# Patient Record
Sex: MALE | Race: WHITE | Employment: OTHER | URBAN - NONMETROPOLITAN AREA
[De-identification: names, ages, dates, MRNs, and addresses within clinical notes are randomized per-mention and may not be internally consistent; named-entity substitution may affect disease eponyms.]

---

## 2023-03-06 ENCOUNTER — APPOINTMENT (OUTPATIENT)
Age: 73
End: 2023-03-06
Payer: COMMERCIAL

## 2023-03-06 ENCOUNTER — HOSPITAL ENCOUNTER (EMERGENCY)
Age: 73
Discharge: ANOTHER HOSPITAL | End: 2023-03-06
Attending: EMERGENCY MEDICINE
Payer: COMMERCIAL

## 2023-03-06 VITALS
OXYGEN SATURATION: 97 % | HEIGHT: 71 IN | RESPIRATION RATE: 18 BRPM | BODY MASS INDEX: 18.48 KG/M2 | HEART RATE: 70 BPM | WEIGHT: 132 LBS | DIASTOLIC BLOOD PRESSURE: 79 MMHG | SYSTOLIC BLOOD PRESSURE: 147 MMHG | TEMPERATURE: 98 F

## 2023-03-06 DIAGNOSIS — R91.8 LUNG MASS: ICD-10-CM

## 2023-03-06 DIAGNOSIS — S72.001A CLOSED RIGHT HIP FRACTURE, INITIAL ENCOUNTER (HCC): Primary | ICD-10-CM

## 2023-03-06 LAB
ABO + RH BLD: NORMAL
ALBUMIN SERPL-MCNC: 2.6 G/DL (ref 3.4–5)
ALBUMIN/GLOB SERPL: 0.6 (ref 0.8–1.7)
ALP SERPL-CCNC: 91 U/L (ref 45–117)
ALT SERPL-CCNC: 11 U/L (ref 16–61)
ANION GAP SERPL CALC-SCNC: 9 MMOL/L (ref 3–18)
APPEARANCE UR: CLEAR
APTT PPP: 33 SEC (ref 23–36.4)
AST SERPL W P-5'-P-CCNC: 7 U/L (ref 10–38)
BASOPHILS # BLD: 0.1 K/UL (ref 0–0.1)
BASOPHILS NFR BLD: 1 % (ref 0–2)
BILIRUB SERPL-MCNC: 0.3 MG/DL (ref 0.2–1)
BILIRUB UR QL: NEGATIVE
BLOOD GROUP ANTIBODIES SERPL: NEGATIVE
BUN SERPL-MCNC: 20 MG/DL (ref 7–18)
BUN/CREAT SERPL: 18 (ref 12–20)
CA-I BLD-MCNC: 9 MG/DL (ref 8.5–10.1)
CHLORIDE SERPL-SCNC: 105 MMOL/L (ref 100–111)
CO2 SERPL-SCNC: 27 MMOL/L (ref 21–32)
COLOR UR: YELLOW
CREAT SERPL-MCNC: 1.12 MG/DL (ref 0.6–1.3)
DIFFERENTIAL METHOD BLD: ABNORMAL
EOSINOPHIL # BLD: 0.3 K/UL (ref 0–0.4)
EOSINOPHIL NFR BLD: 2 % (ref 0–5)
ERYTHROCYTE [DISTWIDTH] IN BLOOD BY AUTOMATED COUNT: 17.2 % (ref 11.6–14.5)
GLOBULIN SER CALC-MCNC: 4.4 G/DL (ref 2–4)
GLUCOSE SERPL-MCNC: 93 MG/DL (ref 74–99)
GLUCOSE UR STRIP.AUTO-MCNC: NEGATIVE MG/DL
HCT VFR BLD AUTO: 36.2 % (ref 36–48)
HGB BLD-MCNC: 11 G/DL (ref 13–16)
HGB UR QL STRIP: NEGATIVE
IMM GRANULOCYTES # BLD AUTO: 0.1 K/UL (ref 0–0.04)
IMM GRANULOCYTES NFR BLD AUTO: 1 % (ref 0–0.5)
INR PPP: 1 (ref 0.8–1.2)
KETONES UR QL STRIP.AUTO: NEGATIVE MG/DL
LEUKOCYTE ESTERASE UR QL STRIP.AUTO: NEGATIVE
LYMPHOCYTES # BLD: 2.5 K/UL (ref 0.9–3.6)
LYMPHOCYTES NFR BLD: 19 % (ref 21–52)
MCH RBC QN AUTO: 27.1 PG (ref 24–34)
MCHC RBC AUTO-ENTMCNC: 30.4 G/DL (ref 31–37)
MCV RBC AUTO: 89.2 FL (ref 78–100)
MONOCYTES # BLD: 0.9 K/UL (ref 0.05–1.2)
MONOCYTES NFR BLD: 7 % (ref 3–10)
NEUTS SEG # BLD: 9.6 K/UL (ref 1.8–8)
NEUTS SEG NFR BLD: 70 % (ref 40–73)
NITRITE UR QL STRIP.AUTO: NEGATIVE
NRBC # BLD: 0 K/UL (ref 0–0.01)
NRBC BLD-RTO: 0 PER 100 WBC
PH UR STRIP: 6.5 (ref 5–8)
PLATELET # BLD AUTO: 350 K/UL (ref 135–420)
PMV BLD AUTO: 10.2 FL (ref 9.2–11.8)
POTASSIUM SERPL-SCNC: 3.6 MMOL/L (ref 3.5–5.5)
PROT SERPL-MCNC: 7 G/DL (ref 6.4–8.2)
PROT UR STRIP-MCNC: 30 MG/DL
PROTHROMBIN TIME: 13.8 SEC (ref 11.5–15.2)
RBC # BLD AUTO: 4.06 M/UL (ref 4.35–5.65)
SODIUM SERPL-SCNC: 141 MMOL/L (ref 136–145)
SP GR UR REFRACTOMETRY: 1.01 (ref 1–1.03)
SPECIMEN EXP DATE BLD: NORMAL
THERAPEUTIC RANGE: NORMAL SEC (ref 82–109)
UROBILINOGEN UR QL STRIP.AUTO: NEGATIVE EU/DL (ref 0.2–1)
WBC # BLD AUTO: 13.6 K/UL (ref 4.6–13.2)

## 2023-03-06 PROCEDURE — 81003 URINALYSIS AUTO W/O SCOPE: CPT

## 2023-03-06 PROCEDURE — 71045 X-RAY EXAM CHEST 1 VIEW: CPT

## 2023-03-06 PROCEDURE — 96376 TX/PRO/DX INJ SAME DRUG ADON: CPT

## 2023-03-06 PROCEDURE — 6360000002 HC RX W HCPCS: Performed by: EMERGENCY MEDICINE

## 2023-03-06 PROCEDURE — 85610 PROTHROMBIN TIME: CPT

## 2023-03-06 PROCEDURE — 85730 THROMBOPLASTIN TIME PARTIAL: CPT

## 2023-03-06 PROCEDURE — 36415 COLL VENOUS BLD VENIPUNCTURE: CPT

## 2023-03-06 PROCEDURE — 86900 BLOOD TYPING SEROLOGIC ABO: CPT

## 2023-03-06 PROCEDURE — 73501 X-RAY EXAM HIP UNI 1 VIEW: CPT

## 2023-03-06 PROCEDURE — 73552 X-RAY EXAM OF FEMUR 2/>: CPT

## 2023-03-06 PROCEDURE — 96374 THER/PROPH/DIAG INJ IV PUSH: CPT

## 2023-03-06 PROCEDURE — 85025 COMPLETE CBC W/AUTO DIFF WBC: CPT

## 2023-03-06 PROCEDURE — 80053 COMPREHEN METABOLIC PANEL: CPT

## 2023-03-06 PROCEDURE — 99285 EMERGENCY DEPT VISIT HI MDM: CPT

## 2023-03-06 RX ORDER — MORPHINE SULFATE 4 MG/ML
4 INJECTION, SOLUTION INTRAMUSCULAR; INTRAVENOUS
Status: COMPLETED | OUTPATIENT
Start: 2023-03-06 | End: 2023-03-06

## 2023-03-06 RX ORDER — CARVEDILOL 25 MG/1
25 TABLET ORAL 2 TIMES DAILY WITH MEALS
COMMUNITY

## 2023-03-06 RX ADMIN — MORPHINE SULFATE 4 MG: 4 INJECTION, SOLUTION INTRAMUSCULAR; INTRAVENOUS at 22:57

## 2023-03-06 RX ADMIN — MORPHINE SULFATE 4 MG: 4 INJECTION, SOLUTION INTRAMUSCULAR; INTRAVENOUS at 19:48

## 2023-03-06 ASSESSMENT — LIFESTYLE VARIABLES
HOW MANY STANDARD DRINKS CONTAINING ALCOHOL DO YOU HAVE ON A TYPICAL DAY: PATIENT DOES NOT DRINK
HOW OFTEN DO YOU HAVE A DRINK CONTAINING ALCOHOL: NEVER

## 2023-03-06 ASSESSMENT — PAIN SCALES - GENERAL
PAINLEVEL_OUTOF10: 0
PAINLEVEL_OUTOF10: 2
PAINLEVEL_OUTOF10: 5
PAINLEVEL_OUTOF10: 4
PAINLEVEL_OUTOF10: 4

## 2023-03-06 ASSESSMENT — PAIN DESCRIPTION - ORIENTATION
ORIENTATION: RIGHT

## 2023-03-06 ASSESSMENT — PAIN DESCRIPTION - LOCATION
LOCATION: HIP

## 2023-03-06 ASSESSMENT — PAIN - FUNCTIONAL ASSESSMENT
PAIN_FUNCTIONAL_ASSESSMENT: 0-10
PAIN_FUNCTIONAL_ASSESSMENT: NONE - DENIES PAIN

## 2023-03-06 ASSESSMENT — PAIN DESCRIPTION - DESCRIPTORS: DESCRIPTORS: DULL

## 2023-03-06 NOTE — ED TRIAGE NOTES
Pt reports he fell about a month ago landing on his right hip, pt reports hip pain since then and has been unable to ambulate.

## 2023-03-07 ENCOUNTER — APPOINTMENT (OUTPATIENT)
Dept: CT IMAGING | Age: 73
DRG: 481 | End: 2023-03-07
Attending: STUDENT IN AN ORGANIZED HEALTH CARE EDUCATION/TRAINING PROGRAM
Payer: COMMERCIAL

## 2023-03-07 ENCOUNTER — APPOINTMENT (OUTPATIENT)
Dept: GENERAL RADIOLOGY | Age: 73
DRG: 481 | End: 2023-03-07
Attending: STUDENT IN AN ORGANIZED HEALTH CARE EDUCATION/TRAINING PROGRAM
Payer: COMMERCIAL

## 2023-03-07 ENCOUNTER — APPOINTMENT (OUTPATIENT)
Dept: GENERAL RADIOLOGY | Age: 73
DRG: 481 | End: 2023-03-07
Attending: PHYSICIAN ASSISTANT
Payer: COMMERCIAL

## 2023-03-07 ENCOUNTER — ANESTHESIA (OUTPATIENT)
Dept: SURGERY | Age: 73
End: 2023-03-07
Payer: COMMERCIAL

## 2023-03-07 ENCOUNTER — ANESTHESIA EVENT (OUTPATIENT)
Dept: SURGERY | Age: 73
End: 2023-03-07
Payer: COMMERCIAL

## 2023-03-07 ENCOUNTER — APPOINTMENT (OUTPATIENT)
Dept: GENERAL RADIOLOGY | Age: 73
DRG: 481 | End: 2023-03-07
Attending: ORTHOPAEDIC SURGERY
Payer: COMMERCIAL

## 2023-03-07 ENCOUNTER — HOSPITAL ENCOUNTER (INPATIENT)
Age: 73
LOS: 6 days | Discharge: NURSING, REHAB OR LONG-TERM CARE | DRG: 481 | End: 2023-03-13
Attending: STUDENT IN AN ORGANIZED HEALTH CARE EDUCATION/TRAINING PROGRAM | Admitting: INTERNAL MEDICINE
Payer: COMMERCIAL

## 2023-03-07 DIAGNOSIS — S72.144B: ICD-10-CM

## 2023-03-07 DIAGNOSIS — R09.89 SUSPECTED DEEP VEIN THROMBOSIS (DVT): ICD-10-CM

## 2023-03-07 DIAGNOSIS — S72.144A CLOSED NONDISPLACED INTERTROCHANTERIC FRACTURE OF RIGHT FEMUR, INITIAL ENCOUNTER (HCC): Primary | ICD-10-CM

## 2023-03-07 PROBLEM — S72.143A INTERTROCHANTERIC FRACTURE (HCC): Status: ACTIVE | Noted: 2023-03-07

## 2023-03-07 LAB
ABO + RH BLD: NORMAL
ALBUMIN SERPL-MCNC: 2.3 G/DL (ref 3.5–5)
ALBUMIN/GLOB SERPL: 0.6 (ref 1.1–2.2)
ALP SERPL-CCNC: 75 U/L (ref 45–117)
ALT SERPL-CCNC: 9 U/L (ref 12–78)
ANION GAP SERPL CALC-SCNC: 3 MMOL/L (ref 5–15)
AST SERPL W P-5'-P-CCNC: 9 U/L (ref 15–37)
BASOPHILS # BLD: 0.1 K/UL (ref 0–0.1)
BASOPHILS NFR BLD: 1 % (ref 0–1)
BILIRUB SERPL-MCNC: 0.3 MG/DL (ref 0.2–1)
BLOOD GROUP ANTIBODIES SERPL: NEGATIVE
BUN SERPL-MCNC: 18 MG/DL (ref 6–20)
BUN/CREAT SERPL: 16 (ref 12–20)
CA-I BLD-MCNC: 8.7 MG/DL (ref 8.5–10.1)
CHLORIDE SERPL-SCNC: 109 MMOL/L (ref 97–108)
CO2 SERPL-SCNC: 26 MMOL/L (ref 21–32)
CREAT SERPL-MCNC: 1.16 MG/DL (ref 0.7–1.3)
DIFFERENTIAL METHOD BLD: ABNORMAL
EKG ATRIAL RATE: 68 BPM
EKG DIAGNOSIS: NORMAL
EKG P AXIS: 65 DEGREES
EKG P-R INTERVAL: 157 MS
EKG Q-T INTERVAL: 433 MS
EKG QRS DURATION: 157 MS
EKG QTC CALCULATION (BAZETT): 457 MS
EKG R AXIS: -48 DEGREES
EKG T AXIS: 98 DEGREES
EKG VENTRICULAR RATE: 67 BPM
EOSINOPHIL # BLD: 0.3 K/UL (ref 0–0.4)
EOSINOPHIL NFR BLD: 2 % (ref 0–7)
ERYTHROCYTE [DISTWIDTH] IN BLOOD BY AUTOMATED COUNT: 17.1 % (ref 11.5–14.5)
GLOBULIN SER CALC-MCNC: 3.9 G/DL (ref 2–4)
GLUCOSE SERPL-MCNC: 103 MG/DL (ref 65–100)
HCT VFR BLD AUTO: 32.1 % (ref 36.6–50.3)
HGB BLD-MCNC: 10 G/DL (ref 12.1–17)
IMM GRANULOCYTES # BLD AUTO: 0 K/UL (ref 0–0.04)
IMM GRANULOCYTES NFR BLD AUTO: 0 % (ref 0–0.5)
INR PPP: 1.1 (ref 0.9–1.1)
LYMPHOCYTES # BLD: 2.7 K/UL (ref 0.8–3.5)
LYMPHOCYTES NFR BLD: 24 % (ref 12–49)
MCH RBC QN AUTO: 27.2 PG (ref 26–34)
MCHC RBC AUTO-ENTMCNC: 31.2 G/DL (ref 30–36.5)
MCV RBC AUTO: 87.5 FL (ref 80–99)
MONOCYTES # BLD: 0.9 K/UL (ref 0–1)
MONOCYTES NFR BLD: 8 % (ref 5–13)
NEUTS SEG # BLD: 7.2 K/UL (ref 1.8–8)
NEUTS SEG NFR BLD: 65 % (ref 32–75)
NRBC # BLD: 0 K/UL (ref 0–0.01)
NRBC BLD-RTO: 0 PER 100 WBC
PLATELET # BLD AUTO: 283 K/UL (ref 150–400)
PMV BLD AUTO: 9.9 FL (ref 8.9–12.9)
POTASSIUM SERPL-SCNC: 3.5 MMOL/L (ref 3.5–5.1)
PROT SERPL-MCNC: 6.2 G/DL (ref 6.4–8.2)
PROTHROMBIN TIME: 13.9 SEC (ref 11.9–14.6)
RBC # BLD AUTO: 3.67 M/UL (ref 4.1–5.7)
SODIUM SERPL-SCNC: 138 MMOL/L (ref 136–145)
SPECIMEN EXP DATE BLD: NORMAL
WBC # BLD AUTO: 11.1 K/UL (ref 4.1–11.1)

## 2023-03-07 PROCEDURE — 76060000034 HC ANESTHESIA 1.5 TO 2 HR: Performed by: ORTHOPAEDIC SURGERY

## 2023-03-07 PROCEDURE — 77030031139 HC SUT VCRL2 J&J -A: Performed by: ORTHOPAEDIC SURGERY

## 2023-03-07 PROCEDURE — 0QS6XZZ REPOSITION RIGHT UPPER FEMUR, EXTERNAL APPROACH: ICD-10-PCS | Performed by: ORTHOPAEDIC SURGERY

## 2023-03-07 PROCEDURE — 99285 EMERGENCY DEPT VISIT HI MDM: CPT

## 2023-03-07 PROCEDURE — 86900 BLOOD TYPING SEROLOGIC ABO: CPT

## 2023-03-07 PROCEDURE — 0QH606Z INSERTION OF INTRAMEDULLARY INTERNAL FIXATION DEVICE INTO RIGHT UPPER FEMUR, OPEN APPROACH: ICD-10-PCS | Performed by: ORTHOPAEDIC SURGERY

## 2023-03-07 PROCEDURE — 74011250637 HC RX REV CODE- 250/637: Performed by: PHYSICIAN ASSISTANT

## 2023-03-07 PROCEDURE — 74011250636 HC RX REV CODE- 250/636: Performed by: NURSE ANESTHETIST, CERTIFIED REGISTERED

## 2023-03-07 PROCEDURE — 77030027467 HC RMR IM BIXCT DISP STRY -F: Performed by: ORTHOPAEDIC SURGERY

## 2023-03-07 PROCEDURE — C1713 ANCHOR/SCREW BN/BN,TIS/BN: HCPCS | Performed by: ORTHOPAEDIC SURGERY

## 2023-03-07 PROCEDURE — 77030016452 HC BIT DRL AO4 STRY -C: Performed by: ORTHOPAEDIC SURGERY

## 2023-03-07 PROCEDURE — 73700 CT LOWER EXTREMITY W/O DYE: CPT

## 2023-03-07 PROCEDURE — C1769 GUIDE WIRE: HCPCS | Performed by: ORTHOPAEDIC SURGERY

## 2023-03-07 PROCEDURE — 74011000250 HC RX REV CODE- 250: Performed by: PHYSICIAN ASSISTANT

## 2023-03-07 PROCEDURE — 65270000029 HC RM PRIVATE

## 2023-03-07 PROCEDURE — 74011000250 HC RX REV CODE- 250: Performed by: NURSE ANESTHETIST, CERTIFIED REGISTERED

## 2023-03-07 PROCEDURE — 74011250636 HC RX REV CODE- 250/636: Performed by: STUDENT IN AN ORGANIZED HEALTH CARE EDUCATION/TRAINING PROGRAM

## 2023-03-07 PROCEDURE — 80053 COMPREHEN METABOLIC PANEL: CPT

## 2023-03-07 PROCEDURE — 2709999900 HC NON-CHARGEABLE SUPPLY: Performed by: ORTHOPAEDIC SURGERY

## 2023-03-07 PROCEDURE — 74011250636 HC RX REV CODE- 250/636: Performed by: PHYSICIAN ASSISTANT

## 2023-03-07 PROCEDURE — 74011000250 HC RX REV CODE- 250: Performed by: ANESTHESIOLOGY

## 2023-03-07 PROCEDURE — 76010000153 HC OR TIME 1.5 TO 2 HR: Performed by: ORTHOPAEDIC SURGERY

## 2023-03-07 PROCEDURE — 85610 PROTHROMBIN TIME: CPT

## 2023-03-07 PROCEDURE — 76210000006 HC OR PH I REC 0.5 TO 1 HR: Performed by: ORTHOPAEDIC SURGERY

## 2023-03-07 PROCEDURE — 36415 COLL VENOUS BLD VENIPUNCTURE: CPT

## 2023-03-07 PROCEDURE — 0QS636Z REPOSITION RIGHT UPPER FEMUR WITH INTRAMEDULLARY INTERNAL FIXATION DEVICE, PERCUTANEOUS APPROACH: ICD-10-PCS | Performed by: ORTHOPAEDIC SURGERY

## 2023-03-07 PROCEDURE — 76000 FLUOROSCOPY <1 HR PHYS/QHP: CPT

## 2023-03-07 PROCEDURE — 77030002916 HC SUT ETHLN J&J -A: Performed by: ORTHOPAEDIC SURGERY

## 2023-03-07 PROCEDURE — 85025 COMPLETE CBC W/AUTO DIFF WBC: CPT

## 2023-03-07 PROCEDURE — 77030040361 HC SLV COMPR DVT MDII -B: Performed by: ORTHOPAEDIC SURGERY

## 2023-03-07 PROCEDURE — 73552 X-RAY EXAM OF FEMUR 2/>: CPT

## 2023-03-07 PROCEDURE — 74011250637 HC RX REV CODE- 250/637: Performed by: INTERNAL MEDICINE

## 2023-03-07 DEVICE — LONG NAIL KIT R1.5, TI, RIGHT
Type: IMPLANTABLE DEVICE | Site: HIP | Status: FUNCTIONAL
Brand: GAMMA

## 2023-03-07 DEVICE — LAG SCREW, TI
Type: IMPLANTABLE DEVICE | Site: HIP | Status: FUNCTIONAL
Brand: GAMMA

## 2023-03-07 DEVICE — LOCKING SCREW, FULLY THREADED: Type: IMPLANTABLE DEVICE | Site: HIP | Status: FUNCTIONAL

## 2023-03-07 RX ORDER — OXYCODONE AND ACETAMINOPHEN 5; 325 MG/1; MG/1
1 TABLET ORAL
Status: CANCELLED | OUTPATIENT
Start: 2023-03-07 | End: 2023-03-08

## 2023-03-07 RX ORDER — MORPHINE SULFATE 4 MG/ML
4 INJECTION INTRAVENOUS
Status: DISCONTINUED | OUTPATIENT
Start: 2023-03-07 | End: 2023-03-07

## 2023-03-07 RX ORDER — MORPHINE SULFATE 15 MG/1
15 TABLET ORAL
Status: CANCELLED | OUTPATIENT
Start: 2023-03-07 | End: 2023-03-08

## 2023-03-07 RX ORDER — ACETAMINOPHEN 500 MG
1000 TABLET ORAL EVERY 6 HOURS
Status: DISCONTINUED | OUTPATIENT
Start: 2023-03-07 | End: 2023-03-13 | Stop reason: HOSPADM

## 2023-03-07 RX ORDER — TRAMADOL HYDROCHLORIDE 50 MG/1
50 TABLET ORAL
Status: DISCONTINUED | OUTPATIENT
Start: 2023-03-07 | End: 2023-03-09

## 2023-03-07 RX ORDER — PROPOFOL 10 MG/ML
INJECTION, EMULSION INTRAVENOUS AS NEEDED
Status: DISCONTINUED | OUTPATIENT
Start: 2023-03-07 | End: 2023-03-07 | Stop reason: HOSPADM

## 2023-03-07 RX ORDER — CEFAZOLIN SODIUM 1 G/3ML
INJECTION, POWDER, FOR SOLUTION INTRAMUSCULAR; INTRAVENOUS AS NEEDED
Status: DISCONTINUED | OUTPATIENT
Start: 2023-03-07 | End: 2023-03-07 | Stop reason: HOSPADM

## 2023-03-07 RX ORDER — HYDROMORPHONE HYDROCHLORIDE 1 MG/ML
0.5 INJECTION, SOLUTION INTRAMUSCULAR; INTRAVENOUS; SUBCUTANEOUS
Status: CANCELLED | OUTPATIENT
Start: 2023-03-07

## 2023-03-07 RX ORDER — FENTANYL CITRATE 50 UG/ML
50 INJECTION, SOLUTION INTRAMUSCULAR; INTRAVENOUS AS NEEDED
Status: CANCELLED | OUTPATIENT
Start: 2023-03-07

## 2023-03-07 RX ORDER — MIDAZOLAM HYDROCHLORIDE 1 MG/ML
1 INJECTION, SOLUTION INTRAMUSCULAR; INTRAVENOUS AS NEEDED
Status: CANCELLED | OUTPATIENT
Start: 2023-03-07

## 2023-03-07 RX ORDER — ONDANSETRON 2 MG/ML
INJECTION INTRAMUSCULAR; INTRAVENOUS AS NEEDED
Status: DISCONTINUED | OUTPATIENT
Start: 2023-03-07 | End: 2023-03-07 | Stop reason: HOSPADM

## 2023-03-07 RX ORDER — DIPHENHYDRAMINE HYDROCHLORIDE 50 MG/ML
12.5 INJECTION, SOLUTION INTRAMUSCULAR; INTRAVENOUS AS NEEDED
Status: CANCELLED | OUTPATIENT
Start: 2023-03-07 | End: 2023-03-07

## 2023-03-07 RX ORDER — SODIUM CHLORIDE 9 MG/ML
50 INJECTION, SOLUTION INTRAVENOUS CONTINUOUS
Status: DISCONTINUED | OUTPATIENT
Start: 2023-03-07 | End: 2023-03-09

## 2023-03-07 RX ORDER — SODIUM CHLORIDE, SODIUM LACTATE, POTASSIUM CHLORIDE, CALCIUM CHLORIDE 600; 310; 30; 20 MG/100ML; MG/100ML; MG/100ML; MG/100ML
INJECTION, SOLUTION INTRAVENOUS
Status: DISCONTINUED | OUTPATIENT
Start: 2023-03-07 | End: 2023-03-07 | Stop reason: HOSPADM

## 2023-03-07 RX ORDER — CARVEDILOL 12.5 MG/1
25 TABLET ORAL 2 TIMES DAILY WITH MEALS
Status: DISCONTINUED | OUTPATIENT
Start: 2023-03-07 | End: 2023-03-13 | Stop reason: HOSPADM

## 2023-03-07 RX ORDER — CELECOXIB 200 MG/1
200 CAPSULE ORAL 2 TIMES DAILY
Status: DISCONTINUED | OUTPATIENT
Start: 2023-03-07 | End: 2023-03-11

## 2023-03-07 RX ORDER — METOPROLOL TARTRATE 5 MG/5ML
2.5 INJECTION INTRAVENOUS
Status: CANCELLED | OUTPATIENT
Start: 2023-03-07

## 2023-03-07 RX ORDER — LIDOCAINE HYDROCHLORIDE 20 MG/ML
INJECTION, SOLUTION EPIDURAL; INFILTRATION; INTRACAUDAL; PERINEURAL AS NEEDED
Status: DISCONTINUED | OUTPATIENT
Start: 2023-03-07 | End: 2023-03-07 | Stop reason: HOSPADM

## 2023-03-07 RX ORDER — LABETALOL HCL 20 MG/4 ML
5 SYRINGE (ML) INTRAVENOUS
Status: CANCELLED | OUTPATIENT
Start: 2023-03-07

## 2023-03-07 RX ORDER — MORPHINE SULFATE 2 MG/ML
2 INJECTION, SOLUTION INTRAMUSCULAR; INTRAVENOUS
Status: CANCELLED | OUTPATIENT
Start: 2023-03-07

## 2023-03-07 RX ORDER — EPHEDRINE SULFATE/0.9% NACL/PF 50 MG/5 ML
5 SYRINGE (ML) INTRAVENOUS AS NEEDED
Status: CANCELLED | OUTPATIENT
Start: 2023-03-07

## 2023-03-07 RX ORDER — DEXAMETHASONE SODIUM PHOSPHATE 4 MG/ML
INJECTION, SOLUTION INTRA-ARTICULAR; INTRALESIONAL; INTRAMUSCULAR; INTRAVENOUS; SOFT TISSUE AS NEEDED
Status: DISCONTINUED | OUTPATIENT
Start: 2023-03-07 | End: 2023-03-07 | Stop reason: HOSPADM

## 2023-03-07 RX ORDER — ENOXAPARIN SODIUM 100 MG/ML
40 INJECTION SUBCUTANEOUS EVERY 24 HOURS
Status: DISCONTINUED | OUTPATIENT
Start: 2023-03-08 | End: 2023-03-13 | Stop reason: HOSPADM

## 2023-03-07 RX ORDER — CARVEDILOL 25 MG/1
25 TABLET ORAL 2 TIMES DAILY WITH MEALS
COMMUNITY

## 2023-03-07 RX ORDER — BUPIVACAINE HYDROCHLORIDE 7.5 MG/ML
INJECTION, SOLUTION INTRASPINAL
Status: COMPLETED | OUTPATIENT
Start: 2023-03-07 | End: 2023-03-07

## 2023-03-07 RX ORDER — SODIUM CHLORIDE 0.9 % (FLUSH) 0.9 %
5-40 SYRINGE (ML) INJECTION EVERY 8 HOURS
Status: CANCELLED | OUTPATIENT
Start: 2023-03-07

## 2023-03-07 RX ORDER — FENTANYL CITRATE 50 UG/ML
INJECTION, SOLUTION INTRAMUSCULAR; INTRAVENOUS AS NEEDED
Status: DISCONTINUED | OUTPATIENT
Start: 2023-03-07 | End: 2023-03-07 | Stop reason: HOSPADM

## 2023-03-07 RX ORDER — LIDOCAINE HYDROCHLORIDE 10 MG/ML
0.1 INJECTION, SOLUTION EPIDURAL; INFILTRATION; INTRACAUDAL; PERINEURAL AS NEEDED
Status: CANCELLED | OUTPATIENT
Start: 2023-03-07

## 2023-03-07 RX ORDER — OXYCODONE AND ACETAMINOPHEN 5; 325 MG/1; MG/1
1 TABLET ORAL AS NEEDED
Status: CANCELLED | OUTPATIENT
Start: 2023-03-07

## 2023-03-07 RX ORDER — MORPHINE SULFATE 2 MG/ML
1 INJECTION, SOLUTION INTRAMUSCULAR; INTRAVENOUS
Status: DISPENSED | OUTPATIENT
Start: 2023-03-07 | End: 2023-03-09

## 2023-03-07 RX ORDER — FENTANYL CITRATE 50 UG/ML
50 INJECTION, SOLUTION INTRAMUSCULAR; INTRAVENOUS
Status: CANCELLED | OUTPATIENT
Start: 2023-03-07

## 2023-03-07 RX ORDER — HYDROCODONE BITARTRATE AND ACETAMINOPHEN 5; 325 MG/1; MG/1
1 TABLET ORAL
Status: CANCELLED | OUTPATIENT
Start: 2023-03-07 | End: 2023-03-08

## 2023-03-07 RX ORDER — HYDRALAZINE HYDROCHLORIDE 20 MG/ML
10 INJECTION INTRAMUSCULAR; INTRAVENOUS
Status: DISCONTINUED | OUTPATIENT
Start: 2023-03-07 | End: 2023-03-07 | Stop reason: HOSPADM

## 2023-03-07 RX ORDER — SODIUM CHLORIDE 0.9 % (FLUSH) 0.9 %
5-40 SYRINGE (ML) INJECTION AS NEEDED
Status: CANCELLED | OUTPATIENT
Start: 2023-03-07

## 2023-03-07 RX ORDER — ONDANSETRON 2 MG/ML
4 INJECTION INTRAMUSCULAR; INTRAVENOUS AS NEEDED
Status: CANCELLED | OUTPATIENT
Start: 2023-03-07

## 2023-03-07 RX ORDER — MIDAZOLAM HYDROCHLORIDE 1 MG/ML
INJECTION, SOLUTION INTRAMUSCULAR; INTRAVENOUS AS NEEDED
Status: DISCONTINUED | OUTPATIENT
Start: 2023-03-07 | End: 2023-03-07 | Stop reason: HOSPADM

## 2023-03-07 RX ORDER — MIDAZOLAM HYDROCHLORIDE 1 MG/ML
0.5 INJECTION, SOLUTION INTRAMUSCULAR; INTRAVENOUS
Status: CANCELLED | OUTPATIENT
Start: 2023-03-07

## 2023-03-07 RX ORDER — OXYCODONE HYDROCHLORIDE 5 MG/1
5 TABLET ORAL
Status: CANCELLED | OUTPATIENT
Start: 2023-03-07 | End: 2023-03-08

## 2023-03-07 RX ORDER — HYDROMORPHONE HYDROCHLORIDE 2 MG/1
1 TABLET ORAL
Status: CANCELLED | OUTPATIENT
Start: 2023-03-07 | End: 2023-03-08

## 2023-03-07 RX ADMIN — TRAMADOL HYDROCHLORIDE 50 MG: 50 TABLET ORAL at 18:33

## 2023-03-07 RX ADMIN — MIDAZOLAM HYDROCHLORIDE 1 MG: 2 INJECTION, SOLUTION INTRAMUSCULAR; INTRAVENOUS at 15:05

## 2023-03-07 RX ADMIN — BUPIVACAINE HYDROCHLORIDE 13.5 MG: 7.5 INJECTION, SOLUTION INTRASPINAL at 15:07

## 2023-03-07 RX ADMIN — CARVEDILOL 25 MG: 12.5 TABLET, FILM COATED ORAL at 17:20

## 2023-03-07 RX ADMIN — SODIUM CHLORIDE 75 ML/HR: 9 INJECTION, SOLUTION INTRAVENOUS at 10:05

## 2023-03-07 RX ADMIN — ACETAMINOPHEN 1000 MG: 325 TABLET ORAL at 18:33

## 2023-03-07 RX ADMIN — CEFAZOLIN 2 G: 1 INJECTION, POWDER, FOR SOLUTION INTRAMUSCULAR; INTRAVENOUS at 22:29

## 2023-03-07 RX ADMIN — ONDANSETRON 4 MG: 2 INJECTION INTRAMUSCULAR; INTRAVENOUS at 15:17

## 2023-03-07 RX ADMIN — MIDAZOLAM HYDROCHLORIDE 1 MG: 2 INJECTION, SOLUTION INTRAMUSCULAR; INTRAVENOUS at 15:17

## 2023-03-07 RX ADMIN — FENTANYL CITRATE 25 MCG: 0.05 INJECTION, SOLUTION INTRAMUSCULAR; INTRAVENOUS at 15:52

## 2023-03-07 RX ADMIN — PROPOFOL 20 MG: 10 INJECTION, EMULSION INTRAVENOUS at 15:17

## 2023-03-07 RX ADMIN — LIDOCAINE HYDROCHLORIDE 60 MG: 20 INJECTION, SOLUTION EPIDURAL; INFILTRATION; INTRACAUDAL; PERINEURAL at 15:15

## 2023-03-07 RX ADMIN — SODIUM CHLORIDE, POTASSIUM CHLORIDE, SODIUM LACTATE AND CALCIUM CHLORIDE: 600; 310; 30; 20 INJECTION, SOLUTION INTRAVENOUS at 14:57

## 2023-03-07 RX ADMIN — SODIUM CHLORIDE 75 ML/HR: 9 INJECTION, SOLUTION INTRAVENOUS at 04:12

## 2023-03-07 RX ADMIN — CELECOXIB 200 MG: 200 CAPSULE ORAL at 20:28

## 2023-03-07 RX ADMIN — DEXAMETHASONE SODIUM PHOSPHATE 4 MG: 4 INJECTION, SOLUTION INTRA-ARTICULAR; INTRALESIONAL; INTRAMUSCULAR; INTRAVENOUS; SOFT TISSUE at 15:17

## 2023-03-07 RX ADMIN — FENTANYL CITRATE 25 MCG: 0.05 INJECTION, SOLUTION INTRAMUSCULAR; INTRAVENOUS at 15:57

## 2023-03-07 RX ADMIN — MORPHINE SULFATE 1 MG: 2 INJECTION, SOLUTION INTRAMUSCULAR; INTRAVENOUS at 20:28

## 2023-03-07 RX ADMIN — CEFAZOLIN SODIUM 2 G: 1 INJECTION, POWDER, FOR SOLUTION INTRAMUSCULAR; INTRAVENOUS at 15:23

## 2023-03-07 NOTE — OP NOTES
Operative report on Reji Fallon  Date of surgery 7 March 2023   preop diagnosis is intertrochanteric fracture right hip with subtrochanteric extension  Postop diagnosis same  Procedure closed reduction and and placement of a long gamma nail to the right femur  Surgeon Dr Pardeep Mccann anesthesia by Dr. Rojas Chi  Blood loss 50 cc  Complications none  Assistant none  Indication for surgery intertrochanteric fracture right hip with subtrochanteric extension  Clinical note  Patient is 81years old and he presented with an intertrochanteric fracture of the right hip with subtrochanteric extension. Procedure note after induction of general anesthesia and endotracheal intubation patient was positioned supine on the fracture table. We made sure that the hydrocele was supported with towels as not to cause any traction on the scrotum. The scrotum was placed on the opposite side from the fracture. Gentle traction was applied to the right lower limb in neutral rotation. C-arm was used to assess reduction on the AP and lateral view and this was found to be satisfactory. The abdomen right hip and right lower limb were prepped and draped in the usual fashion. We then proceeded with an incision proximal from the tip of the greater trochanter. A Steinmann pin was introduced into the tip of the greater trochanter and its placement was checked on the AP and lateral view using the C arm and found to be satisfactory. We proceeded with reaming and opening along the Steinmann pin. A guidewire was then introduced into the intramedullary canal of the proximal fragment and pushed into the intramedullary canal of the distal fragment. C-arm was used to assess the position of the guidewire on the AP and lateral view and this was found to be satisfactory. Proceeded with reaming along the guidewire all the way to 13 mm. A 10 mm x 380 mm jazmine was then pushed along the guidewire.   The guidewire was then removed and using the appropriate guide and another lateral proximal incision we proceeded with placing a Steinmann pin into the neck and into the head. The position of the Steinmann pin was checked on the AP and lateral view and found to be satisfactory. We proceeded with reaming along the Steinmann pin all the way to 5 mm from the subchondral bone. A lag screw was then placed along with the Steinmann pin and this measured 95 mm. A lag screw was locked with a small screw proximally. Distally the jazmine was locked with 1 locking screw through a small incision and this was done under fluoroscopic guidance. C-arm was used to assess reduction on the AP and lateral view and this was found to be satisfactory. As well the position of the hardware was found to be optimal.  We washed out the wound with dilute Benadene. We closed the subcutaneous layer with interrupted 0 Vicryl sutures. Skin and subcutaneous tissue was closed with vertical mattresses of 2-0 nylon we also used staples. Testing was applied with Betadine ointment Telfa gauze ABD and tape. The patient tolerated the procedure well and was taken back to the recovery room in good condition. Estimated blood loss was 50 cc. The sponge and needle count was correct at the end of the procedure.

## 2023-03-07 NOTE — BRIEF OP NOTE
Brief Postoperative Note    Patient: Mark Burroughs  YOB: 1950  MRN: [id number]    Date of Procedure: 3/7/2023     Pre-Op Diagnosis: Intertrochanteric fracture right hip with subtrochanteric extension    Post-Op Diagnosis: Same as preoperative diagnosis. Procedure(s): FEMUR GAMMA NAIL INSERTION ON RIGHT    Surgeon(s):  Savannah Ham MD    Surgical Assistant: None    Anesthesia: General     Estimated Blood Loss (mL): less than 50     Complications: None    Specimens: * No specimens in log *     Implants:   Implant Name Type Inv.  Item Serial No.  Lot No. LRB No. Used Action   NAIL KT LNG 125D [id number] RT -- STRL GAMMA 3 - SN/A  NAIL KT LNG 125D [id number] RT -- STRL GAMMA 3 N/A [employer] [id number] Right 1 Implanted   SCR BNE LAG GAMMA 3 10.5X95MM -- TI STRL - SN/A  SCR BNE LAG GAMMA 3 10.5X95MM -- TI STRL N/A [employer] [id number] Right 1 Implanted   SCR BNE LCK T2 FT 5X55MM TI -- STRL - SNA  SCR BNE LCK T2 FT 5X55MM TI -- STRL NA [employer] [id number] Right 1 Implanted       Drains: * No LDAs found *    Findings: Osteoporotic bone    Electronically Signed by Jerardo Molina MD on 3/7/2023 at 5:34 PM

## 2023-03-07 NOTE — ANESTHESIA POSTPROCEDURE EVALUATION
Procedure(s): FEMUR GAMMA NAIL INSERTION ON RIGHT. spinal    Anesthesia Post Evaluation        Patient location during evaluation: PACU  Patient participation: complete - patient cannot participate  Level of consciousness: awake  Pain score: 0  Pain management: adequate  Airway patency: patent  Anesthetic complications: no  Cardiovascular status: acceptable  Respiratory status: acceptable  Hydration status: acceptable  Post anesthesia nausea and vomiting:  controlled  Final Post Anesthesia Temperature Assessment:  Normothermia (36.0-37.5 degrees C)      INITIAL Post-op Vital signs:   Vitals Value Taken Time   /86 03/07/23 1655   Temp 36.8 °C (98.3 °F) 03/07/23 1640   Pulse 75 03/07/23 1659   Resp 14 03/07/23 1658   SpO2 91 % 03/07/23 1659   Vitals shown include unvalidated device data.

## 2023-03-07 NOTE — ED NOTES
Patient to be transferred to [address] [place] [address] transport with an ETA of an hour. Report called to [facility]. Report given to [facility]azar, [facility].       [address], [address] [place] [address]  03/06/23 5689

## 2023-03-07 NOTE — PERIOP NOTES
TRANSFER - OUT REPORT:    Verbal report given to Mickey Blair RN (name) on [facility]  being transferred to  (unit) for routine post - op       Report consisted of patients Situation, Background, Assessment and   Recommendations(SBAR). Information from the following report(s) Procedure Summary, MAR, and Quality Measures was reviewed with the receiving nurse. Opportunity for questions and clarification was provided.       Patient transported with:   Monitor  Registered Nurse

## 2023-03-07 NOTE — ANESTHESIA PROCEDURE NOTES
Spinal Block    Start time: 3/7/2023 3:05 PM  End time: 3/7/2023 3:08 PM  Performed by: Ray Guadalupe CRNA  Authorized by: Subha Alvarado MD     Pre-procedure:   Indications: primary anesthetic  Preanesthetic Checklist: patient identified, risks and benefits discussed, anesthesia consent, site marked, patient being monitored and timeout performed    Timeout Time: 15:04 EST      Spinal Block:   Patient Position:  Right lateral decubitus  Prep Region:  Lumbar  Prep: Betadine      Location:  L3-4  Technique:  Single shot  Local: bupivacaine 0.75% in dextrose 8.25% preserv-free (SENSORCAINE) Intrathecal - Intrathecal   13.5 mg - 3/7/2023 3:07:00 PM  Local Dose (mL):  1.8  Med Admin Time: 3/7/2023 3:07 PM    Needle:   Needle Type:  Pencan  Needle Gauge:  25 G  Attempts:  1      Events: CSF confirmed        Assessment:  Insertion:  Uncomplicated  Patient tolerance:  Patient tolerated the procedure well with no immediate complications

## 2023-03-07 NOTE — PROGRESS NOTES
Reason for Admission:  Fx Inter trochanter                     RUR Score:    10%                 Plan for utilizing home health:   None @ this time/uses cane @ times/performs own ADLs/ 3 outside steps only. Awaiting therapy evals to decided dispo. PCP: First and Last name:  Per pt no PCP. Name of Practice:    Are you a current patient: Yes/No:    Approximate date of last visit:    Can you participate in a virtual visit with your PCP: Yes/call/Has cell phone. Current Advanced Directive/Advance Care Plan: Full Code      Healthcare Decision Maker:              Primary Decision MakeChris Henderson - Daughter - [phone]                  Transition of Care Plan:                    Pt lives with daughter. D/C Plan undecided @ this time. Send Rxs to [facility] [place].

## 2023-03-07 NOTE — Clinical Note
Status[de-identified] INPATIENT [101]   Type of Bed: Telemetry [19]   Cardiac Monitoring Required?: Yes   Inpatient Hospitalization Certified Necessary for the Following Reasons: 3.  Patient receiving treatment that can only be provided in an inpatient setting (further clarification in H&P documentation)   Admitting Diagnosis: Intertrochanteric fracture [facility]) [460725]   Admitting Physician: Ron Hammond [[id number]]   Attending Physician: Ron Hammond [[id number]]   Estimated Length of Stay: 2 Midnights   Discharge Plan[de-identified] Home with Office Follow-up

## 2023-03-07 NOTE — ED PROVIDER NOTES
[facility]  EMERGENCY DEPARTMENT ENCOUNTER NOTE    Date: 3/7/2023  Patient Name: Ela Pagan    History of Presenting Illness     Chief Complaint   Patient presents with    Transfer Of Care       History obtained from: Patient     HPI: Ela Pagan, 81 y.o. male with a past medical history and outpatient medications as listed and reviewed below  presents for hip pain. He was transferred here for intratrochanteric fracture. He presents to an outside hospital with right-sided hip pain after a fall from few days ago. X-ray showed intertrochanteric fracture without displacement. Normal neurovascular exam.  He was transferred here for further evaluation and orthopedic consultation. He has no acute complaints. No chest pain or shortness of breath. No vomiting, nausea, or vomiting. No fevers or chills. .    Medical History   I reviewed the medical, surgical, family, and social history, as well as allergies:    PCP: None    Past Medical History:  Past Medical History:   Diagnosis Date    Hypertension      Past Surgical History:  History reviewed. No pertinent surgical history. Current Outpatient Medications:  Current Outpatient Medications   Medication Instructions    carvediloL (COREG) 25 mg, Oral, 2 TIMES DAILY WITH MEALS      Family History:  History reviewed. No pertinent family history. Social History:  Social History     Tobacco Use    Smoking status: Every Day     Packs/day: 0.50     Types: Cigarettes    Smokeless tobacco: Never   Substance Use Topics    Alcohol use: Never    Drug use: Never     Allergies:  No Known Allergies    Review of Systems     Review of Systems  Negative: Positives and pertinent negatives as per HPI. All other systems were reviewed and are negative. Physical Exam & Vital Signs   Vital Signs - I reviewed the patient's vital signs.     Patient Vitals for the past 12 hrs:   Temp Pulse Resp BP SpO2   03/07/23 0245 -- 66 -- (!) 146/81 95 % 03/07/23 0104 97.9 °F (36.6 °C) 63 18 (!) 149/85 95 %     Physical Exam:    GENERAL: awake, alert, cooperative, not in distress  HEENT:  * Pupils equal, EOMI  * Head atraumatic  CV:  * audible heart sounds  * warm and perfused extremities bilaterally  PULMONARY: Good air movement, no wheezes, no crackles  ABDOMEN/: soft, no distension, no guarding, no abdominal tenderness  EXTREMITIES/BACK: warm and perfused, noted R hip tenderness, no edema. Normal DP and PT pulses. No tenderness except over the right lateral hip. Normal knee, ankle, and leg exams. SKIN: no rashes or signs of trauma  NEURO:  * Speech clear  * Moves U&LE to command    Medical Decision Making     Patient is a 81 y.o. male presenting for R hip farcture. Vitals reveal no significant abnormalities and physical exam reveals  R hip tenderness . Based on the history, physical exam, risk factors, and vital signs, differential includes: Hip fracture. No concern for dislocation. No concern for neurovascular compromise the patient has normal DP and PT pulses. Will consult orthopedics. Will admit. .    See ED Course and Reassessment for evaluation and discussion. Consultant Discussions/Recs: None  Records Reviewed: Nursing Notes  Social Determinants of health affecting management: None    ED Course & Reassessment     ED Course:     ED Course as of 03/07/23 0326   Tue Mar 07, 2023   0308 CBC does not show any evidence of acute process. Leukocytosis not present to suggest infection. Hemoglobin not suggestive of acute anemia. No significant electrolyte derangements. Creatinine is not elevated more than baseline range making PATRICK unlikely. No significant transaminitis noted. Normal bilirubin. INR and PT are within normal limits. [SS]      ED Course User Index  [SS] Beryle Bors, MD       Reassessment:    Will admit. Diagnosis     Clinical Impression:   1.  Nondisp intertroch fx right femur, init for opn fx type I/2 ([address]        Final Disposition     Admission: [facility] 76    After completion of ED workup and discussion of results and diagnoses, patient was admitted to the hospital. Case was discussed with admitting provider. Procedures, Critical Care, & Clinical Tools   Performed by: Toño Day MD  Procedures     Not Applicable     Results, Consults, Medications     Consults:  IP CONSULT TO ORTHOPEDIC SURGERY   Labs:  Recent Results (from the past 12 hour(s))   CBC WITH AUTOMATED DIFF    Collection Time: 03/07/23  2:18 AM   Result Value Ref Range    WBC 11.1 4.1 - 11.1 K/uL    RBC 3.67 (L) 4.10 - 5.70 M/uL    HGB 10.0 (L) 12.1 - 17.0 g/dL    HCT 32.1 (L) 36.6 - 50.3 %    MCV 87.5 80.0 - 99.0 FL    MCH 27.2 26.0 - 34.0 PG    MCHC 31.2 30.0 - 36.5 g/dL    RDW 17.1 (H) 11.5 - 14.5 %    PLATELET 618 673 - 059 K/uL    MPV 9.9 8.9 - 12.9 FL    NRBC 0.0 0.0  WBC    ABSOLUTE NRBC 0.00 0.00 - 0.01 K/uL    NEUTROPHILS 65 32 - 75 %    LYMPHOCYTES 24 12 - 49 %    MONOCYTES 8 5 - 13 %    EOSINOPHILS 2 0 - 7 %    BASOPHILS 1 0 - 1 %    IMMATURE GRANULOCYTES 0 0 - 0.5 %    ABS. NEUTROPHILS 7.2 1.8 - 8.0 K/UL    ABS. LYMPHOCYTES 2.7 0.8 - 3.5 K/UL    ABS. MONOCYTES 0.9 0.0 - 1.0 K/UL    ABS. EOSINOPHILS 0.3 0.0 - 0.4 K/UL    ABS. BASOPHILS 0.1 0.0 - 0.1 K/UL    ABS. IMM.  GRANS. 0.0 0.00 - 0.04 K/UL    DF AUTOMATED     PROTHROMBIN TIME + INR    Collection Time: 03/07/23  2:18 AM   Result Value Ref Range    Prothrombin time 13.9 11.9 - 14.6 sec    INR 1.1 0.9 - 1.1     METABOLIC PANEL, COMPREHENSIVE    Collection Time: 03/07/23  2:18 AM   Result Value Ref Range    Sodium 138 136 - 145 mmol/L    Potassium 3.5 3.5 - 5.1 mmol/L    Chloride 109 (H) 97 - 108 mmol/L    CO2 26 21 - 32 mmol/L    Anion gap 3 (L) 5 - 15 mmol/L    Glucose 103 (H) 65 - 100 mg/dL    BUN 18 6 - 20 mg/dL    Creatinine 1.16 0.70 - 1.30 mg/dL    BUN/Creatinine ratio 16 12 - 20      eGFR >60 >60 ml/min/1.73m2    Calcium 8.7 8.5 - 10.1 mg/dL    Bilirubin, total 0.3 0.2 - 1.0 mg/dL    AST (SGOT) 9 (L) 15 - 37 U/L    ALT (SGPT) 9 (L) 12 - 78 U/L    Alk. phosphatase 75 45 - 117 U/L    Protein, total 6.2 (L) 6.4 - 8.2 g/dL    Albumin 2.3 (L) 3.5 - 5.0 g/dL    Globulin 3.9 2.0 - 4.0 g/dL    A-G Ratio 0.6 (L) 1.1 - 2.2       Radiologic Studies:  CT Results  (Last 48 hours)      None          CXR Results  (Last 48 hours)      None          Medications ordered:  Medications   0.9% sodium chloride infusion (has no administration in time range)   morphine injection 4 mg (has no administration in time range)       Documentation Comments   - I am the first and primary provider for this patient and am the primary provider of record. - Initial assessment performed. The patients presenting problems have been discussed, and the staff are in agreement with the care plan formulated and outlined with them. I have encouraged them to ask questions as they arise throughout their visit. - Available medical records, nursing notes, old EKGs, and EMS run sheets (if patient was EMS transported) were reviewed    Please note that this dictation was completed with [email or web address], the computer voice recognition software. Quite often unanticipated grammatical, syntax, homophones, and other interpretive errors are inadvertently transcribed by the computer software. Please disregard these errors. Please excuse any errors that have escaped final proofreading.

## 2023-03-07 NOTE — ACP (ADVANCE CARE PLANNING)
Advance Care Planning   Healthcare Decision Maker:       Primary Decision Maker: Joslyn Sathya - Daughter - [phone]

## 2023-03-07 NOTE — H&P
History and Physical (Inpatient)    Patient:    Huy Tapia   81 y.o. Chief Complaint:   Chief Complaint   Patient presents with    Transfer Of Care         History of Present Illness: This is a 46-year-old  male with history of chronic tobacco use hypertension who presented to the hospital at the request of her daughter. He had falling while trying to make his bed about a month ago but did not seek any help. He has been bedbound and had son doing things for him. They are from [address] [place] [address]. He said he did not seek help because he had too many business to take care of. Eventually daughter came over and suggested he come to the hospital.  In the hospital is found to have right hip fracture recommended for admission. He has some minimal pain no chest pain shortness of breath at rest.  No prior history of heart attack stroke heart failure. He is not on oxygen at home. ROS: He denies any chronic headache blurry vision URI symptoms denies any chest pain palpitation. He has dry cough no shortness of breath at rest no orthopnea PND denies any abdominal pain nausea vomiting diarrhea constipation. He is lost some weight he said his appetite is just not good. He has no urinary symptoms of urgency or frequency hematuria. Denies any leg swelling. He has hip pain he is not ambulatory since he is falling few weeks now. History:  Past Medical History:   Diagnosis Date    Hypertension    Possible underlying COPD    Past surgical history some left leg surgery unsure  Status post wisdom tooth extraction    Social history   has 3 children  He smokes about a half pack a day. He denies alcohol use or drug use. Family history  Both parents are . Father  of lung cancer around 71 mother  of old age at 80    Social Connections: Not on file     History reviewed. No pertinent family history.     Allergies:  No Known Allergies    Current Medications:    Current Facility-Administered Medications:     0.9% sodium chloride infusion, 50 mL/hr, IntraVENous, CONTINUOUS, Ace Chavez PA-C, Last Rate: 50 mL/hr at 03/07/23 1329, 50 mL/hr at 03/07/23 1329    carvediloL (COREG) tablet 25 mg, 25 mg, Oral, BID WITH MEALS, Kingsley Bruner MD    acetaminophen (TYLENOL) tablet 1,000 mg, 1,000 mg, Oral, Q6H, Ace Chavez PA-C    traMADoL (ULTRAM) tablet 50 mg, 50 mg, Oral, Q6H PRN, Ace Chavez PA-C    celecoxib (CELEBREX) capsule 200 mg, 200 mg, Oral, BID, Ace Chavez PA-C    morphine injection 1 mg, 1 mg, IntraVENous, Q6H PRN, Ace Chavez PA-C    *Please  patient's home medications from pharmacy at discharge*, 1 Each, Other, PRIOR TO DISCHARGE, Sarah Sanchez MD       Physical Exam:  Visit Vitals  BP (!) 154/80 (BP 1 Location: Right upper arm, BP Patient Position: Lying)   Pulse (!) 55   Temp 98.4 °F (36.9 °C)   Resp 16   Ht 5' 11\" (1.803 m)   Wt 61.2 kg (135 lb)   SpO2 96%   BMI 18.83 kg/m²     On examination is an elderly  male comfortably no respiratory distress. HEENT normocephalic atraumatic anicteric sclera. Oral mucosa has 3 teeth left no mucosal lesion  Neck is without any adenopathy. Posterior neck has a hard lump about a nickel size. He said has been there for a long time  Lungs diminished breath sounds no coarse crackles or wheeze  Heart S1-S2 regular  Abdomen soft flat nontender nondistended positive bowel sounds no appreciable organomegaly  Pelvic area he has large testicles not completely examined suspicious for a hydrocele but he said he is always had huge testicles without any problems or pain  Lower extremities without any cyanosis or edema. Evidence of muscle wasting. CNS alert and oriented x3 moves extremities on the left. Is not able to raise the right against gravity. Diminished dorsalis pedis pulse bilaterally  Psych cooperative    Impression.   This is a 63-year-old  male with history of chronic tobacco use possibly underlying COPD but not on any breathing treatment oxygen history of hypertension presents status post fall for most a month bedbound presents now found to have right hip fracture. Findings/Diagnosis: #1. Right hip fracture  #2. Status post fall  #3. Hypertension  #4. Chronic tobacco use possible underlying COPD asymptomatic  #5. Bilateral large hydrocele    Laboratory:      Recent Results (from the past 24 hour(s))   CBC WITH AUTOMATED DIFF    Collection Time: 03/07/23  2:18 AM   Result Value Ref Range    WBC 11.1 4.1 - 11.1 K/uL    RBC 3.67 (L) 4.10 - 5.70 M/uL    HGB 10.0 (L) 12.1 - 17.0 g/dL    HCT 32.1 (L) 36.6 - 50.3 %    MCV 87.5 80.0 - 99.0 FL    MCH 27.2 26.0 - 34.0 PG    MCHC 31.2 30.0 - 36.5 g/dL    RDW 17.1 (H) 11.5 - 14.5 %    PLATELET 604 117 - 578 K/uL    MPV 9.9 8.9 - 12.9 FL    NRBC 0.0 0.0  WBC    ABSOLUTE NRBC 0.00 0.00 - 0.01 K/uL    NEUTROPHILS 65 32 - 75 %    LYMPHOCYTES 24 12 - 49 %    MONOCYTES 8 5 - 13 %    EOSINOPHILS 2 0 - 7 %    BASOPHILS 1 0 - 1 %    IMMATURE GRANULOCYTES 0 0 - 0.5 %    ABS. NEUTROPHILS 7.2 1.8 - 8.0 K/UL    ABS. LYMPHOCYTES 2.7 0.8 - 3.5 K/UL    ABS. MONOCYTES 0.9 0.0 - 1.0 K/UL    ABS. EOSINOPHILS 0.3 0.0 - 0.4 K/UL    ABS. BASOPHILS 0.1 0.0 - 0.1 K/UL    ABS. IMM.  GRANS. 0.0 0.00 - 0.04 K/UL    DF AUTOMATED     TYPE & SCREEN    Collection Time: 03/07/23  2:18 AM   Result Value Ref Range    Crossmatch Expiration 03/10/2023,2359     ABO/Rh(D) Araseli Cervantes Positive     Antibody screen Negative    PROTHROMBIN TIME + INR    Collection Time: 03/07/23  2:18 AM   Result Value Ref Range    Prothrombin time 13.9 11.9 - 14.6 sec    INR 1.1 0.9 - 1.1     METABOLIC PANEL, COMPREHENSIVE    Collection Time: 03/07/23  2:18 AM   Result Value Ref Range    Sodium 138 136 - 145 mmol/L    Potassium 3.5 3.5 - 5.1 mmol/L    Chloride 109 (H) 97 - 108 mmol/L    CO2 26 21 - 32 mmol/L    Anion gap 3 (L) 5 - 15 mmol/L    Glucose 103 (H) 65 - 100 mg/dL    BUN 18 6 - 20 mg/dL    Creatinine 1. 16 0.70 - 1.30 mg/dL    BUN/Creatinine ratio 16 12 - 20      eGFR >60 >60 ml/min/1.73m2    Calcium 8.7 8.5 - 10.1 mg/dL    Bilirubin, total 0.3 0.2 - 1.0 mg/dL    AST (SGOT) 9 (L) 15 - 37 U/L    ALT (SGPT) 9 (L) 12 - 78 U/L    Alk. phosphatase 75 45 - 117 U/L    Protein, total 6.2 (L) 6.4 - 8.2 g/dL    Albumin 2.3 (L) 3.5 - 5.0 g/dL    Globulin 3.9 2.0 - 4.0 g/dL    A-G Ratio 0.6 (L) 1.1 - 2.2         CT HIP RT WO CONT   Final Result   1. Comminuted intertrochanteric right hip fracture   2. Severe degeneration of the right hip with osteopenia   3. Massive hydroceles              Plan of Care/Planned Procedure: Admit to the hospital.  Orthopedic has been consulted. DVT prophylaxis. Patient will have right hip surgery. Pain medication. Discussed with daughter. Low risk. He refuses nicotine replacement therapy. Will need inpatient rehab afterwards. Follow-up labs. Pain medication as needed.

## 2023-03-07 NOTE — ANESTHESIA PREPROCEDURE EVALUATION
Relevant Problems   No relevant active problems       Anesthetic History   No history of anesthetic complications            Review of Systems / Medical History  Patient summary reviewed, nursing notes reviewed and pertinent labs reviewed    Pulmonary          Smoker         Neuro/Psych   Within defined limits           Cardiovascular    Hypertension                   GI/Hepatic/Renal  Within defined limits              Endo/Other        Anemia     Other Findings            Past Medical History:   Diagnosis Date    Hypertension        History reviewed. No pertinent surgical history.     Current Outpatient Medications   Medication Instructions    carvediloL (COREG) 25 mg, Oral, 2 TIMES DAILY WITH MEALS       Current Facility-Administered Medications   Medication Dose Route Frequency    0.9% sodium chloride infusion  50 mL/hr IntraVENous CONTINUOUS    carvediloL (COREG) tablet 25 mg  25 mg Oral BID WITH MEALS    acetaminophen (TYLENOL) tablet 1,000 mg  1,000 mg Oral Q6H    traMADoL (ULTRAM) tablet 50 mg  50 mg Oral Q6H PRN    celecoxib (CELEBREX) capsule 200 mg  200 mg Oral BID    morphine injection 1 mg  1 mg IntraVENous Q6H PRN    *Please  patient's home medications from pharmacy at discharge*  1 Each Other PRIOR TO DISCHARGE       Patient Vitals for the past 24 hrs:   Temp Pulse Resp BP SpO2   03/07/23 1200 -- (!) 55 -- -- --   03/07/23 0943 36.9 °C (98.4 °F) 65 16 (!) 154/80 96 %   03/07/23 0900 36.6 °C (97.8 °F) 69 -- -- 95 %   03/07/23 0830 -- 64 -- (!) 154/88 93 %   03/07/23 0800 -- (!) 56 21 (!) 153/83 94 %   03/07/23 0730 -- 63 20 (!) 151/83 96 %   03/07/23 0245 -- 66 -- (!) 146/81 95 %   03/07/23 0104 36.6 °C (97.9 °F) 63 18 (!) 149/85 95 %       Lab Results   Component Value Date/Time    WBC 11.1 03/07/2023 02:18 AM    HGB 10.0 (L) 03/07/2023 02:18 AM    HCT 32.1 (L) 03/07/2023 02:18 AM    PLATELET 085 08/09/3647 02:18 AM    MCV 87.5 03/07/2023 02:18 AM     Lab Results   Component Value Date/Time    Sodium 138 03/07/2023 02:18 AM    Potassium 3.5 03/07/2023 02:18 AM    Chloride 109 (H) 03/07/2023 02:18 AM    CO2 26 03/07/2023 02:18 AM    Anion gap 3 (L) 03/07/2023 02:18 AM    Glucose 103 (H) 03/07/2023 02:18 AM    BUN 18 03/07/2023 02:18 AM    Creatinine 1.16 03/07/2023 02:18 AM    BUN/Creatinine ratio 16 03/07/2023 02:18 AM    Calcium 8.7 03/07/2023 02:18 AM     Lab Results   Component Value Date/Time    PTP 13.9 03/07/2023 02:18 AM    INR 1.1 03/07/2023 02:18 AM     Lab Results   Component Value Date/Time    Glucose 103 (H) 03/07/2023 02:18 AM     Physical Exam    Airway  Mallampati: I  TM Distance: 4 - 6 cm  Neck ROM: normal range of motion   Mouth opening: Normal     Cardiovascular    Rhythm: regular  Rate: normal         Dental    Dentition: Edentulous     Pulmonary  Breath sounds clear to auscultation               Abdominal  GI exam deferred       Other Findings            Anesthetic Plan    ASA: 2  Anesthesia type: spinal    Monitoring Plan: Continuous noninvasive hemodynamic monitoring        Anesthetic plan and risks discussed with: Patient and Family      Benefits and risks of spinal anesthesia discussed with patient/family. All questions answered.

## 2023-03-07 NOTE — ED PROVIDER NOTES
EMERGENCY DEPARTMENT HISTORY AND PHYSICAL EXAM      Patient Name: Trevon Nicolas  MRN: [id number]  YOB: 1950  Provider: Maria Boyce DO  PCP: None None   Time/Date of evaluation: 12:41 AM EST on 3/7/23    History of Presenting Illness     Chief Complaint   Patient presents with    Hip Pain     right       History Provided By: Patient     History Lennox Hurry): Trevon Nicolas is a 81 y.o. male with a PMHX of hypertension  who presents to the emergency department  by POV C/O right hip pain onset for 1 month. Patient states he fell about a month ago landing on his right hip. Patient states he has been pretty much in bed ever since that time. Patient states that he has not had any further falls. Patient states he is unable to walk. Patient states that it hurts to move his right hip. Patient denies any other injuries or complaints. Past History     Past Medical History:  Past Medical History:   Diagnosis Date    Hypertension        Past Surgical History:  History reviewed. No pertinent surgical history. Family History:  History reviewed. No pertinent family history. Social History:  Social History     Tobacco Use    Smoking status: Every Day     Packs/day: 0.50     Types: Cigarettes    Smokeless tobacco: Never   Vaping Use    Vaping Use: Never used   Substance Use Topics    Alcohol use: Not Currently    Drug use: Not Currently     Types: Marijuana Maurita Handsome)       Medications:  No current facility-administered medications for this encounter.      Current Outpatient Medications   Medication Sig Dispense Refill    carvedilol (COREG) 25 MG tablet Take 25 mg by mouth 2 times daily (with meals)         Allergies:  No Known Allergies    Social Determinants of Health:  Social Determinants of Health     Tobacco Use: High Risk    Smoking Tobacco Use: Every Day    Smokeless Tobacco Use: Never    Passive Exposure: Not on file   Alcohol Use: Not At Risk    Frequency of Alcohol Consumption: Never    Average Number of Drinks: Patient does not drink    Frequency of Binge Drinking: Never   Financial Resource Strain: Not on file   Food Insecurity: Not on file   Transportation Needs: Not on file   Physical Activity: Not on file   Stress: Not on file   Social Connections: Not on file   Intimate Partner Violence: Not on file   Depression: Not on file   Housing Stability: Not on file       Review of Systems   Review of Systems   Musculoskeletal:  Positive for arthralgias and myalgias. Negative except as listed above in HPI. Physical Exam     Vitals:    03/06/23 2230 03/06/23 2231 03/06/23 2257 03/06/23 2322   BP:  (!) 148/90  (!) 147/79   Pulse: 67 69  70   Resp:  20 18 18   Temp:    98 °F (36.7 °C)   TempSrc:    Oral   SpO2:  97%  97%   Weight:       Height:           Physical Exam      Constitutional: Non-toxic appearing, no distress  HEENT: Normocephalic, Atraumatic; PERRL; Mouth or mucosa moist  Neck: Supple  Cardiovascular: Regular rate and rhythm, no murmurs, rubs, or gallops  Chest: Normal work of breathing and chest excursion bilaterally  Lungs: Clear to ausculation bilaterally  Abdomen: Soft, non tender, non distended, normoactive bowel sounds  Back: No evidence of trauma or deformity  Extremities: Right lower extremity is externally rotated and shortened. There is tenderness in the hip area.   No LE edema  Skin: Warm and dry, normal cap refill  Neuro: Alert and appropriate, CN intact, normal speech, strength and sensation full and symmetric bilaterally, normal coordination  Psychiatric: Normal mood and affect     Diagnostic Study Results     Labs:  Recent Results (from the past 12 hour(s))   CBC with Auto Differential    Collection Time: 03/06/23  7:09 PM   Result Value Ref Range    WBC 13.6 (H) 4.6 - 13.2 K/uL    RBC 4.06 (L) 4.35 - 5.65 M/uL    Hemoglobin 11.0 (L) 13.0 - 16.0 g/dL    Hematocrit 36.2 36.0 - 48.0 %    MCV 89.2 78.0 - 100.0 FL    MCH 27.1 24.0 - 34.0 PG    MCHC 30.4 (L) 31.0 - 37.0 g/dL    RDW 17. 2 (H) 11.6 - 14.5 %    Platelets 049 374 - 235 K/uL    MPV 10.2 9.2 - 11.8 FL    Nucleated RBCs 0.0 0.0  WBC    nRBC 0.00 0.00 - 0.01 K/uL    Seg Neutrophils 70 40 - 73 %    Lymphocytes 19 (L) 21 - 52 %    Monocytes 7 3 - 10 %    Eosinophils % 2 0 - 5 %    Basophils 1 0 - 2 %    Immature Granulocytes 1 (H) 0 - 0.5 %    Segs Absolute 9.6 (H) 1.8 - 8.0 K/UL    Absolute Lymph # 2.5 0.9 - 3.6 K/UL    Absolute Mono # 0.9 0.05 - 1.2 K/UL    Absolute Eos # 0.3 0.0 - 0.4 K/UL    Basophils Absolute 0.1 0.0 - 0.1 K/UL    Absolute Immature Granulocyte 0.1 (H) 0.00 - 0.04 K/UL    Differential Type AUTOMATED     CMP    Collection Time: 03/06/23  7:09 PM   Result Value Ref Range    Sodium 141 136 - 145 mmol/L    Potassium 3.6 3.5 - 5.5 mmol/L    Chloride 105 100 - 111 mmol/L    CO2 27 21 - 32 mmol/L    Anion Gap 9 3.0 - 18.0 mmol/L    Glucose 93 74 - 99 mg/dL    BUN 20 (H) 7 - 18 mg/dL    Creatinine 1.12 0.60 - 1.30 mg/dL    Bun/Cre Ratio 18 12 - 20      Est, Glom Filt Rate >60 >60 ml/min/1.73m2    Calcium 9.0 8.5 - 10.1 mg/dL    Total Bilirubin 0.3 0.2 - 1.0 mg/dL    AST 7 (L) 10 - 38 U/L    ALT 11 (L) 16 - 61 U/L    Alk Phosphatase 91 45 - 117 U/L    Total Protein 7.0 6.4 - 8.2 g/dL    Albumin 2.6 (L) 3.4 - 5.0 g/dL    Globulin 4.4 (H) 2.0 - 4.0 g/dL    Albumin/Globulin Ratio 0.6 (L) 0.8 - 1.7     TYPE AND SCREEN    Collection Time: 03/06/23  7:09 PM   Result Value Ref Range    Crossmatch expiration date 03/09/2023,235     ABO/Rh O Positive     Antibody Screen Negative    Protime-INR    Collection Time: 03/06/23  7:09 PM   Result Value Ref Range    Protime 13.8 11.5 - 15.2 sec    INR 1.0 0.8 - 1.2     APTT    Collection Time: 03/06/23  7:09 PM   Result Value Ref Range    PTT 33.0 23.0 - 36.4 sec    Therapeutic Range   82 - 109 sec   EKG 12 Lead    Collection Time: 03/06/23  7:48 PM   Result Value Ref Range    Ventricular Rate 67 BPM    Atrial Rate 68 BPM    P-R Interval 157 ms    QRS Duration 157 ms    Q-T Interval 433 ms    QTc Calculation (Bazett) 457 ms    P Axis 65 degrees    R Axis -48 degrees    T Axis 98 degrees    Diagnosis Sinus rhythm    Urinalysis    Collection Time: 03/06/23  9:56 PM   Result Value Ref Range    Color, UA YELLOW     Appearance CLEAR     Specific Gravity, UA 1.015 1.005 - 1.030      pH, Urine 6.5 5.0 - 8.0      Protein, UA 30 (A) Negative mg/dL    Glucose, UA Negative Negative mg/dL    Ketones, Urine Negative Negative mg/dL    Bilirubin Urine Negative Negative      Blood, Urine Negative Negative      Urobilinogen, Urine Negative 0.2 - 1.0 EU/dL    Nitrite, Urine Negative Negative      Leukocyte Esterase, Urine Negative Negative         Radiologic Studies:   XR CHEST PORTABLE   Final Result      Left perihilar infiltrate versus spiculated mass. Recommend CT   Lung hyperinflation      XR FEMUR RIGHT (MIN 2 VIEWS)   Final Result   Right intratrochanteric fracture. Severe right hip osteoarthritis      XR HIP 1 VW W PELVIS RIGHT   Final Result   Acute right femoral basicervical neck fracture and possible intertrochanteric   involvement. XR FEMUR RIGHT (MIN 2 VIEWS)    Result Date: 3/6/2023  EXAM: XR FEMUR RIGHT (MIN 2 VIEWS) INDICATION: hip fx. COMPARISON: 1813 hours FINDINGS: Two views of the right femur demonstrate a nondisplaced comminuted right intratrochanteric fracture. There is severe right hip osteoarthritis with joint space narrowing and osteophytosis. Right intratrochanteric fracture. Severe right hip osteoarthritis    XR CHEST PORTABLE    Result Date: 3/6/2023  EXAM: XR CHEST PORTABLE INDICATION: preop COMPARISON: None. FINDINGS: A portable AP radiograph of the chest was obtained at 1917 hours. The lungs are hyperinflated and there is a left perihilar infiltrate or spiculated mass. . The cardiac and mediastinal contours and pulmonary vascularity are remarkable for tortuosity of the descending aorta. The bones and soft tissues are grossly within normal limits.      Left perihilar infiltrate versus spiculated mass. Recommend CT Lung hyperinflation    XR HIP 1 VW W PELVIS RIGHT    Result Date: 3/6/2023  INDICATION: fall injury Reason for exam:->fall injury EXAMINATION:  HIP RADIOGRAPHS COMPARISON: None FINDINGS: Single AP view of the pelvis and lateral view of the right hip demonstrate acute mildly displaced basicervical femoral neck fracture possibly involving the greater and lesser trochanters. The pubic symphysis is intact. The sacroiliac joints are intact. Severe bilateral hip osteoarthritis. Acute right femoral basicervical neck fracture and possible intertrochanteric involvement. ED EKG interpretation: (Preliminary)  Rhythm: normal sinus rhythm and PAC's and left branch bundle block; and regular . Rate (approx.): 67; Axis: normal; P wave: normal; QRS interval: normal ; ST/T wave: Appropriate ST-T wave changes; Other findings: Abnormal EKG with left bundle branch block. EKG interpreted by Roly Martinez DO, ED Physician. Procedures     Procedures    ED Course     12:41 AM EST PAULO Martinez DO) am the first provider for this patient. Initial assessment performed. I reviewed the vital signs, available nursing notes, past medical history, past surgical history, family history and social history. The patients presenting problems have been discussed, and they are in agreement with the care plan formulated and outlined with them. I have encouraged them to ask questions as they arise throughout their visit. Records Reviewed: Nursing Notes    Is this patient to be included in the SEP-1 core measure due to severe sepsis or septic shock? No   Exclusion criteria - the patient is NOT to be included for SEP-1 Core Measure due to:   Infection is not suspected    MEDICATIONS ADMINISTERED IN THE ED:  Medications   morphine injection 4 mg (4 mg IntraVENous Given 3/6/23 1948)   morphine injection 4 mg (4 mg IntraVENous Given 3/6/23 6085)            Medical Decision Making CC/HPI Summary, DDx, ED Course, and Reassessment: 53-year-old male chief complaint of right hip pain. Differential diagnoses include fracture, dislocation, pelvic fracture, muscular strain. Patient remained stable throughout his time in emergency room. Discussed results plan with patient. We do not have orthopedic coverage here this week. Patient will be transferred for further evaluation and treatment. Disposition Considerations (tests considered but not done, Admit vs D/C, Shared Decision Making, Pt Expectation of Test or Tx.): Chest x-ray was ordered for preop. It did show a left hilar mass versus atelectasis or pneumonia. Patient does have a strong smoking history and suspect that this will need to be followed up with CT imaging. [facility]  [facility] Consult for EM. Discussed available diagnostic tests and clinical findings. He is in agreement with care plans as outlined. Dr. Sterling Goldberg excepts the patient to [facility] for further evaluation and treatment. Critical Care Time:     FRIEDA Ayoub DO    Diagnosis and Disposition     DIAGNOSIS:   1. Closed right hip fracture, initial encounter ([facility] [address]    2. Lung mass        DISPOSITION Decision To Transfer 03/06/2023 07:16:11 PM      PATIENT REFERRED TO:  Transfer to [facility]         (Please note that portions of this note were completed with a voice recognition program.  Efforts were made to edit the dictations but occasionally words are mis-transcribed.)    Rosamaria Ayoub DO (electronically signed)    I, Rosamaria Ayoub DO, am the primary clinician of record. Татьянаon Disclaimer     Please note that this dictation was completed with [employer], the computer voice recognition software. Quite often unanticipated grammatical, syntax, homophones, and other interpretive errors are inadvertently transcribed by the computer software.   Please disregard these errors. Please excuse any errors that have escaped final proofreading.     Amarilys Raygoza DO  (Electronically signed)           Jose Hanson DO  03/07/23 9053

## 2023-03-07 NOTE — CONSULTS
PROGRESS NOTE      Chief Complaints:  Chief Complaint   Patient presents with    Transfer Of Care      Pain in the right hip and difficulty ambulating after a fall    HPI and  Objective:    Patient is 81years old. He suffered a fall complaining of right hip pain a few days ago. He presented a few days later to the hospital complaining of right hip pain and inability to ambulate. Past Medical History:   Diagnosis Date    Hypertension        History reviewed. No pertinent surgical history. History reviewed. No pertinent family history. Social History     Socioeconomic History    Marital status: SINGLE     Spouse name: Not on file    Number of children: Not on file    Years of education: Not on file    Highest education level: Not on file   Occupational History    Not on file   Tobacco Use    Smoking status: Every Day     Packs/day: 0.50     Types: Cigarettes    Smokeless tobacco: Never   Substance and Sexual Activity    Alcohol use: Never    Drug use: Never    Sexual activity: Not on file   Other Topics Concern    Not on file   Social History Narrative    Not on file     Social Determinants of Health     Financial Resource Strain: Not on file   Food Insecurity: Not on file   Transportation Needs: Not on file   Physical Activity: Not on file   Stress: Not on file   Social Connections: Not on file   Intimate Partner Violence: Not on file   Housing Stability: Not on file       Review of Systems:  Rest of review of system reviewed personally and they are negative. EXAM:  Visit Vitals  BP (!) 154/80 (BP 1 Location: Right upper arm, BP Patient Position: Lying)   Pulse 65   Temp 98.4 °F (36.9 °C)   Resp 16   Ht 5' 11\" (1.803 m)   Wt 135 lb (61.2 kg)   SpO2 96%   BMI 18.83 kg/m²     Patient is awake and cooperative. Vitals normal.  Cervical spine nontender. Both clavicle nontender. Both upper extremity nontender. Neurovascular intact both hands. Chest nontender. Abdomen soft nontender.   No sign of any hip fracture on the left. No tenderness anywhere in left lower limb. Neurovascular intact left foot. External and internal rotation of the right hip causes severe pain. No tenderness in the right thigh right knee right leg and right ankle right foot. Neurovascular tact right foot. Patient is awake   Head and neck atraumatic, normocephalic. ENT: No hoarse voice, gaze appropriate. Cardiac system regular rate rhythm. Pulmonary no audible wheeze, no cyanosis. Chest wall excursion normal with respiration cycle  Abdomen is soft not particularly distended. Neurologically nonfocal.  Hematology system: No bruising noted. Musculoskeletal system: No joint deformity noted. Genitourinary system: No hematuria noted. Skin is warm and moist.  Psychosocial: Cooperative. Vascular examination as previously noted no changes. Current Facility-Administered Medications   Medication Dose Route Frequency Provider Last Rate Last Admin    0.9% sodium chloride infusion  75 mL/hr IntraVENous CONTINUOUS Florentin James MD 75 mL/hr at 03/07/23 1005 75 mL/hr at 03/07/23 1005    morphine injection 4 mg  4 mg IntraVENous Q4H PRN Florentin James MD           Recent Results (from the past 25 hour(s))   CBC WITH AUTOMATED DIFF    Collection Time: 03/07/23  2:18 AM   Result Value Ref Range    WBC 11.1 4.1 - 11.1 K/uL    RBC 3.67 (L) 4.10 - 5.70 M/uL    HGB 10.0 (L) 12.1 - 17.0 g/dL    HCT 32.1 (L) 36.6 - 50.3 %    MCV 87.5 80.0 - 99.0 FL    MCH 27.2 26.0 - 34.0 PG    MCHC 31.2 30.0 - 36.5 g/dL    RDW 17.1 (H) 11.5 - 14.5 %    PLATELET 211 585 - 401 K/uL    MPV 9.9 8.9 - 12.9 FL    NRBC 0.0 0.0  WBC    ABSOLUTE NRBC 0.00 0.00 - 0.01 K/uL    NEUTROPHILS 65 32 - 75 %    LYMPHOCYTES 24 12 - 49 %    MONOCYTES 8 5 - 13 %    EOSINOPHILS 2 0 - 7 %    BASOPHILS 1 0 - 1 %    IMMATURE GRANULOCYTES 0 0 - 0.5 %    ABS. NEUTROPHILS 7.2 1.8 - 8.0 K/UL    ABS. LYMPHOCYTES 2.7 0.8 - 3.5 K/UL    ABS.  MONOCYTES 0.9 0.0 - 1.0 K/UL    ABS. EOSINOPHILS 0.3 0.0 - 0.4 K/UL    ABS. BASOPHILS 0.1 0.0 - 0.1 K/UL    ABS. IMM. GRANS. 0.0 0.00 - 0.04 K/UL    DF AUTOMATED     TYPE & SCREEN    Collection Time: 03/07/23  2:18 AM   Result Value Ref Range    Crossmatch Expiration 03/10/2023,2359     ABO/Rh(D) O Positive     Antibody screen Negative    PROTHROMBIN TIME + INR    Collection Time: 03/07/23  2:18 AM   Result Value Ref Range    Prothrombin time 13.9 11.9 - 14.6 sec    INR 1.1 0.9 - 1.1     METABOLIC PANEL, COMPREHENSIVE    Collection Time: 03/07/23  2:18 AM   Result Value Ref Range    Sodium 138 136 - 145 mmol/L    Potassium 3.5 3.5 - 5.1 mmol/L    Chloride 109 (H) 97 - 108 mmol/L    CO2 26 21 - 32 mmol/L    Anion gap 3 (L) 5 - 15 mmol/L    Glucose 103 (H) 65 - 100 mg/dL    BUN 18 6 - 20 mg/dL    Creatinine 1.16 0.70 - 1.30 mg/dL    BUN/Creatinine ratio 16 12 - 20      eGFR >60 >60 ml/min/1.73m2    Calcium 8.7 8.5 - 10.1 mg/dL    Bilirubin, total 0.3 0.2 - 1.0 mg/dL    AST (SGOT) 9 (L) 15 - 37 U/L    ALT (SGPT) 9 (L) 12 - 78 U/L    Alk. phosphatase 75 45 - 117 U/L    Protein, total 6.2 (L) 6.4 - 8.2 g/dL    Albumin 2.3 (L) 3.5 - 5.0 g/dL    Globulin 3.9 2.0 - 4.0 g/dL    A-G Ratio 0.6 (L) 1.1 - 2.2         XR Results (most recent):  No results found for this or any previous visit. ASSESSMENT:   1. Nondisp intertroch fx right femur, init for opn fx type I/2 ([facility] [address]  Intertrochanteric fracture of the right hip with extension at the level of the subtrochanteric region. PLAN:      Heme with a long gamma nail today. After the surgery partial weightbearing to the right lower leg with a walker now. Patient understand the risk and benefit the alternatives he wants me to proceed surgery today. No follow-ups on file.

## 2023-03-08 ENCOUNTER — APPOINTMENT (OUTPATIENT)
Dept: NON INVASIVE DIAGNOSTICS | Age: 73
DRG: 481 | End: 2023-03-08
Attending: ORTHOPAEDIC SURGERY
Payer: COMMERCIAL

## 2023-03-08 PROCEDURE — 97116 GAIT TRAINING THERAPY: CPT

## 2023-03-08 PROCEDURE — 74011250636 HC RX REV CODE- 250/636: Performed by: PHYSICIAN ASSISTANT

## 2023-03-08 PROCEDURE — 93970 EXTREMITY STUDY: CPT

## 2023-03-08 PROCEDURE — 74011250637 HC RX REV CODE- 250/637: Performed by: PHYSICIAN ASSISTANT

## 2023-03-08 PROCEDURE — 97530 THERAPEUTIC ACTIVITIES: CPT

## 2023-03-08 PROCEDURE — 97165 OT EVAL LOW COMPLEX 30 MIN: CPT

## 2023-03-08 PROCEDURE — 74011250637 HC RX REV CODE- 250/637: Performed by: INTERNAL MEDICINE

## 2023-03-08 PROCEDURE — 97161 PT EVAL LOW COMPLEX 20 MIN: CPT

## 2023-03-08 PROCEDURE — 65270000029 HC RM PRIVATE

## 2023-03-08 PROCEDURE — 74011000250 HC RX REV CODE- 250: Performed by: PHYSICIAN ASSISTANT

## 2023-03-08 RX ADMIN — ACETAMINOPHEN 1000 MG: 325 TABLET ORAL at 06:20

## 2023-03-08 RX ADMIN — CEFAZOLIN 2 G: 1 INJECTION, POWDER, FOR SOLUTION INTRAMUSCULAR; INTRAVENOUS at 17:11

## 2023-03-08 RX ADMIN — ACETAMINOPHEN 1000 MG: 325 TABLET ORAL at 18:17

## 2023-03-08 RX ADMIN — ENOXAPARIN SODIUM 40 MG: 100 INJECTION SUBCUTANEOUS at 09:04

## 2023-03-08 RX ADMIN — CELECOXIB 200 MG: 200 CAPSULE ORAL at 20:31

## 2023-03-08 RX ADMIN — CEFAZOLIN 2 G: 1 INJECTION, POWDER, FOR SOLUTION INTRAMUSCULAR; INTRAVENOUS at 06:21

## 2023-03-08 RX ADMIN — TRAMADOL HYDROCHLORIDE 50 MG: 50 TABLET ORAL at 09:38

## 2023-03-08 RX ADMIN — SODIUM CHLORIDE 50 ML/HR: 9 INJECTION, SOLUTION INTRAVENOUS at 05:23

## 2023-03-08 RX ADMIN — CELECOXIB 200 MG: 200 CAPSULE ORAL at 09:01

## 2023-03-08 RX ADMIN — ACETAMINOPHEN 1000 MG: 325 TABLET ORAL at 13:25

## 2023-03-08 RX ADMIN — CARVEDILOL 25 MG: 12.5 TABLET, FILM COATED ORAL at 09:01

## 2023-03-08 RX ADMIN — CARVEDILOL 25 MG: 12.5 TABLET, FILM COATED ORAL at 17:16

## 2023-03-08 NOTE — PROGRESS NOTES
PROGRESS NOTE      Chief Complaints:  Chief Complaint   Patient presents with    Transfer Of Care      Status post examination to the right femur for intertrochanteric fracture of the right proximal femur with subtrochanteric extension    HPI and  Objective:    Patient is doing well after the surgery done yesterday. He was able to sit in a chair with no difficulties. Past Medical History:   Diagnosis Date    Hypertension        History reviewed. No pertinent surgical history. History reviewed. No pertinent family history. Social History     Socioeconomic History    Marital status: SINGLE     Spouse name: Not on file    Number of children: Not on file    Years of education: Not on file    Highest education level: Not on file   Occupational History    Not on file   Tobacco Use    Smoking status: Every Day     Packs/day: 0.50     Types: Cigarettes    Smokeless tobacco: Never   Substance and Sexual Activity    Alcohol use: Never    Drug use: Never    Sexual activity: Not on file   Other Topics Concern    Not on file   Social History Narrative    Not on file     Social Determinants of Health     Financial Resource Strain: Not on file   Food Insecurity: Not on file   Transportation Needs: Not on file   Physical Activity: Not on file   Stress: Not on file   Social Connections: Not on file   Intimate Partner Violence: Not on file   Housing Stability: Not on file       Review of Systems:  Rest of review of system reviewed personally and they are negative. EXAM:  Visit Vitals  /78 (BP 1 Location: Left upper arm, BP Patient Position: At rest;Semi fowlers)   Pulse 65   Temp 98.1 °F (36.7 °C)   Resp 18   Ht 5' 11\" (1.803 m)   Wt 135 lb (61.2 kg)   SpO2 96%   BMI 18.83 kg/m²   Right calf is tender. Dressing dry. Neurovascular intact both feet. Patient is awake   Head and neck atraumatic, normocephalic. ENT: No hoarse voice, gaze appropriate. Cardiac system regular rate rhythm.   Pulmonary no audible wheeze, no cyanosis. Chest wall excursion normal with respiration cycle  Abdomen is soft not particularly distended. Neurologically nonfocal.  Hematology system: No bruising noted. Musculoskeletal system: No joint deformity noted. Genitourinary system: No hematuria noted. Skin is warm and moist.  Psychosocial: Cooperative. Vascular examination as previously noted no changes. Current Facility-Administered Medications   Medication Dose Route Frequency Provider Last Rate Last Admin    0.9% sodium chloride infusion  50 mL/hr IntraVENous CONTINUOUS Ace Chavez PA-C 50 mL/hr at 03/08/23 0523 50 mL/hr at 03/08/23 0523    carvediloL (COREG) tablet 25 mg  25 mg Oral BID WITH MEALS René QUINTANILLA MD   25 mg at 03/08/23 0901    acetaminophen (TYLENOL) tablet 1,000 mg  1,000 mg Oral Q6H Ace Chavez PA-C   1,000 mg at 03/08/23 1325    traMADoL (ULTRAM) tablet 50 mg  50 mg Oral Q6H PRN Ace Chavez PA-C   50 mg at 03/08/23 9322    celecoxib (CELEBREX) capsule 200 mg  200 mg Oral BID Ace Chavez PA-C   200 mg at 03/08/23 0901    morphine injection 1 mg  1 mg IntraVENous Q6H PRN Ace Chavez PA-C   1 mg at 03/07/23 2028    *Please  patient's home medications from pharmacy at discharge*  1 Each Other PRIOR TO DISCHARGE René Winslow MD        ceFAZolin (ANCEF) 2 g in sterile water (preservative free) 20 mL IV syringe  2 g IntraVENous Q8H Ace Chavez PA-C   2 g at 03/08/23 0621    enoxaparin (LOVENOX) injection 40 mg  40 mg SubCUTAneous Q24H Ace Chavez PA-C   40 mg at 03/08/23 0904       No results found for this or any previous visit (from the past 24 hour(s)).     XR Results (most recent):  Results from Hospital Encounter encounter on 03/07/23    XR FEMUR RT 2 VS    Narrative  INDICATION:   postop    COMPARISON: None    FINDINGS:    2 views of the right femur demonstrate open reduction internal fixation of the  right femur with an intramedullary jazmine and dynamic hip screw, satisfactory  positioning for intertrochanteric femur fracture. No new fracture. Impression  Status post ORIF right intertrochanteric femur fracture as above. ASSESSMENT:   1. Nondisp intertroch fx right femur, init for opn fx type I/2 ([address]  Calf tenderness on the right. Status post long gamma nail to the right femur for intertrochanteric fracture right hip with subtrochanteric extension. PLAN:      Pressure weightbearing right lower limb. Keep the wound covered at all times. Do not wet the wound. Ultrasound both lower limbs tomorrow. No follow-ups on file. Follow-up at the orthopedic clinic in 2 weeks.

## 2023-03-08 NOTE — ROUTINE PROCESS
Bedside shift change report given to Sanchez Larios RN (oncoming nurse) by Amisha Blum (offgoing nurse). Report included the following information SBAR, MAR, Recent Results, and Quality Measures.

## 2023-03-08 NOTE — PROGRESS NOTES
DC Plan: [facility] [address] [place] [address] (need auth)    PT is recommending IRF. Cm met with pt at the bedside to f/up on his dc plan. Cm informed pt PT's recommendation for IRF. Cm discussed and educated IRF. Pt asked about therapy in the home. Cm discussed IRF vs home health [facility]). Pt would like CM to call his daughter to see what she says. Cm spoke with pt's daughter Jean Carlos Ranks [phone]. Cm discussed pt's dc plan. Daughter is interested in pt going to [address]. Cm explained to daughter Alisia will need to obtain auth from insurance. Cm discussed having a back up plan in place in case insurance does not approve inpatient rehab. CM discussed and educated SNF. Cm informed daughter Tj will leave SNF list in her father's room to review. Daughter plans to visit her father sometime this evening. Cm met with pt at the bedside and informed him of discussion with his daughter. Pt is agreeable with [facility] eval. Choice letter signed. Cm discussed back up plan for SNF. CM informed pt Cm will leave SNF list in his room for him and his daughter to review. Cm left SNF list on pt's bedside table. Referral made via [address] [address].

## 2023-03-08 NOTE — PROGRESS NOTES
Problem: Pain  Goal: *Control of Pain  Outcome: Progressing Towards Goal     Problem: Falls - Risk of  Goal: *Absence of Falls  Description: Document Antonio Fall Risk and appropriate interventions in the flowsheet.   Outcome: Progressing Towards Goal  Note: Fall Risk Interventions:                                Problem: Infection - Risk of, Surgical Site Infection  Goal: *Absence of surgical site infection signs and symptoms  Outcome: Progressing Towards Goal

## 2023-03-08 NOTE — PROGRESS NOTES
Problem: Mobility Impaired (Adult and Pediatric)  Goal: *Acute Goals and Plan of Care (Insert Text)  Description: I with LE HEP x7 days  SBA with bed mob x7 days  SBA with all transfers PWB on R LE x 7 days  Amb 50-75ft with RW and PWB on R LE x 7 days    Pt stated goal: to get better  Outcome: Not Met    PHYSICAL THERAPY EVALUATION  Patient: Huy Tapia [de-identified]81 y.o. male)  Date: 3/8/2023  Primary Diagnosis: Intertrochanteric fracture ([address] [S72.143A]  Procedure(s) (LRB):  FEMUR GAMMA NAIL INSERTION ON RIGHT (Right) 1 Day Post-Op   Precautions: PWB on R LE     In place during session:     ASSESSMENT    Pt is a 81 y.o. male admitted to hospital s/p fall approx 5 weeks ago, and is now s/p closed reduction and placement of gamma nail R femur POD1. Pt is PWB on R LE. Pt semi supine upon PT arrival, agreeable to evaluation. Pt A&O x 4. PLOF listed below. OT/PT co-evaluated as unclear how much assist pt would need, going forward, pt is 1 person asssit. Pt is very motivated to work with therapy and was I with ADLs and amb without AD prior to his fall approx 5 weeks ago. Based on the objective data described below, the patient presents with generalized weakness, impaired functional mobility, impaired amb, impaired balance, and decreased overall functional mobility. (See below for objective details and assist levels). Overall pt tolerated session good today with min A for bed mob, transfers and ambulation with RW. Pt was able to amb approx 3ft with RW and min Ax1. Pt req max verbal cuing for correct amb technique with RW, but was able to demo and maintain PWB on R LE with cues. Pt will benefit from continued skilled PT to address above deficits and return to PLOF. Current PT DC recommendation Inpatient Rehabilitation Facility  as pt will likely be able to tolerate this type of rehab since he was I PTA, once medically appropriate.        PLAN :  Recommendations and Planned Interventions: bed mobility training, transfer training, gait training, therapeutic exercises, patient and family training/education, and therapeutic activities      Recommend for staff: Out of bed to chair for meals, Encourage HEP in prep for ADLs/mobility, Amb to bathroom for toileting with gt belt and AD, and Amb in hallway    Frequency/Duration: Patient will be followed by physical therapy:  3-5x/week to address goals. Recommendation for discharge: (in order for the patient to meet his/her long term goals)  [address] ,  as pt will likely be able to tolerate this type of rehab since he was I PTA    IF patient discharges home will need the following DME: rolling walker         SUBJECTIVE:   Patient semi supine in bed upon PT arrival, agreeable to work with PT    OBJECTIVE DATA SUMMARY:   HISTORY:    Past Medical History:   Diagnosis Date    Hypertension    History reviewed. No pertinent surgical history.     Home Situation  Home Environment: Private residence  # Steps to Enter: 2  Rails to Enter: Yes  Hand Rails : Bilateral  One/Two Story Residence: One story  Living Alone: No  Support Systems: Child(gagan)  Patient Expects to be Discharged to[de-identified] Rehab hospital/unit acute  Current DME Used/Available at Home: None    Personal factors and/or comorbidities impacting plan of care:     Home Situation  Home Environment: Private residence  # Steps to Enter: 2  Rails to Enter: Yes  Hand Rails : Bilateral  One/Two Story Residence: One story  Living Alone: No  Support Systems: Child(gagan)  Patient Expects to be Discharged to[de-identified] Rehab hospital/unit acute  Current DME Used/Available at Home: None    EXAMINATION/PRESENTATION/DECISION MAKING:   Critical Behavior:  Neurologic State: Alert  Orientation Level: Oriented X4  Cognition: Decreased attention/concentration       Skin:  intact where exposed, dressing intact    Edema: none noted    Range Of Motion:  AROM: Generally decreased, functional  R LE grossly limited, L LE grossly WFL      Strength:    Strength: Generally decreased, functional  R LE NT  L LE grossly 4-/5         Tone & Sensation:     Sensation: Intact to LT B LE         Functional Mobility:  Bed Mobility:     Supine to Sit: Minimum assistance        Transfers:  Sit to Stand: Minimum assistance  Stand to Sit: Minimum assistance  Stand Pivot Transfers: Minimum assistance                    Balance:   Sitting: Intact  Standing: Impaired; With support  Standing - Static: Fair;Constant support  Standing - Dynamic : Fair;Constant support    Ambulation/Gait Training:  Distance (ft): 3 Feet (ft)  Assistive Device: Walker, rolling  Ambulation - Level of Assistance: Minimal assistance           Right Side Weight Bearing: Partial (%)         Functional Measure:  Srinivasa Germain AM-PAC 6 Clicks         Basic Mobility Inpatient Short Form  How much difficulty does the patient currently have. .. Unable A Lot A Little None   1. Turning over in bed (including adjusting bedclothes, sheets and blankets)? [] 1   [] 2   [x] 3   [] 4   2. Sitting down on and standing up from a chair with arms ( e.g., wheelchair, bedside commode, etc.)   [] 1   [] 2   [x] 3   [] 4   3. Moving from lying on back to sitting on the side of the bed? [] 1   [] 2   [x] 3   [] 4          How much help from another person does the patient currently need. .. Total A Lot A Little None   4. Moving to and from a bed to a chair (including a wheelchair)? [] 1   [] 2   [x] 3   [] 4   5. Need to walk in hospital room? [] 1   [] 2   [x] 3   [] 4   6. Climbing 3-5 steps with a railing? [] 1   [] 2   [x] 3   [] 4   © 2007, Trustees of Srinivasa Germain, under license to [employer]. All rights reserved     Score:  Initial: 18 Most Recent: X (Date: -- )   Interpretation of Tool:  Represents activities that are increasingly more difficult (i.e. Bed mobility, Transfers, Gait).   Score 24 23 22-20 19-15 14-10 9-7 6   Modifier CH CI CJ CK CL CM CN           Physical Therapy Evaluation Charge Determination   History Examination Presentation Decision-Making   MEDIUM  Complexity : 1-2 comorbidities / personal factors will impact the outcome/ POC  MEDIUM Complexity : 3 Standardized tests and measures addressing body structure, function, activity limitation and / or participation in recreation  LOW Complexity : Stable, uncomplicated  Other Functional Measure [facility] MED      Based on the above components, the patient evaluation is determined to be of the following complexity level: LOW     Pain Ratin-5/10 R LE    Activity Tolerance:   Good    After treatment patient left in no apparent distress:   Bed locked and in lowest position Sitting in chair and Call bell within reach           COMMUNICATION/EDUCATION:   The patients plan of care was discussed with: Occupational therapist and Case management. Fall prevention education was provided and the patient/caregiver indicated understanding. and Patient/family agree to work toward stated goals and plan of care.       Thank you for this referral.  Olayinka Mena   Time Calculation: 20 mins

## 2023-03-08 NOTE — PROGRESS NOTES
Bedside and Verbal shift change report given to Dipak Herrera RN  (oncoming nurse) by Prudence Cohen RN  (offgoing nurse). Report included the following information SBAR, OR Summary, and MAR.

## 2023-03-08 NOTE — PROGRESS NOTES
OCCUPATIONAL THERAPY EVALUATION  Patient: Pablito Estrella (71 y.o. male)  Date: 3/8/2023  Primary Diagnosis: Intertrochanteric fracture ([address] [S72.143A]  Procedure(s) (LRB):  FEMUR GAMMA NAIL INSERTION ON RIGHT (Right) 1 Day Post-Op   Precautions: falls, PWB RLE     In place during session: Peripheral IV    ASSESSMENT  Pt is a 81 y.o. male presenting to [facility] with s/p fall 5 weeks ago, admitted 3/7/23 and currently being treated for s/p closed reduction RLE. Pt received semi-supine in bed upon arrival, AXO x4, and agreeable to OT evaluation. Based on current observations, pt presents with deficits in generalized strength/AROM, bed mobility, static/dynamic sitting balance, static/dynamic standing balance (see PT note for gait details), functional activity tolerance and pain currently impacting overall performance of ADLs and functional transfers/mobility (see below for objective details and assist levels). Max A for donning socks. Pt Min A for supine:sit, sit:stand with vcs for PWB RLE tech with RW. Pt with good carryover Min A for ambulation to chair. In chair, completed bushing teeth with SUP. All needs within reach. Overall, pt tolerates session fair with vcs and RW. Pt would benefit from continued skilled OT services to address current impairments and improve IND and safety with self cares and functional transfers/mobility. Current OT d/c recommendation Inpatient Rehabilitation Facility  once medically appropriate. Pt is motivated and would be able to tolerate 3 hours of therapy. Pt was Ind with ADLS with no AD, still driving PTA. Current Level of Function Impacting Discharge: Other factors to consider for discharge: family/social support, DME, time since onset, severity of deficits, functional baseline     Patient will benefit from skilled therapy intervention to address the above noted impairments.        PLAN :  Recommendations and Planned Interventions: self care training, functional mobility training, therapeutic exercise, balance training, therapeutic activities, and endurance activities    Recommend with staff: Out of bed to chair for meals and Encourage HEP in prep for ADLs/mobility    Recommend next session: Toileting, UB dressing, Seated grooming, and Standing grooming    Frequency/Duration: Patient will be followed by occupational therapy:  3-5x/week to address goals. Recommendation for discharge: (in order for the patient to meet his/her long term goals)  [address]     This discharge recommendation:  Has been made in collaboration with the attending provider and/or case management    IF patient discharges home will need the following DME: shower chair       SUBJECTIVE:   Patient stated I feel better sitting up.     OBJECTIVE DATA SUMMARY:   HISTORY:   Past Medical History:   Diagnosis Date    Hypertension      History reviewed. No pertinent surgical history. Per patient:   Home Situation  Home Environment: Private residence  # Steps to Enter: 2  Rails to Enter: Yes  Hand Rails : Bilateral  One/Two Story Residence: One story  Living Alone: No  Support Systems: Child(gagan)  Patient Expects to be Discharged to[de-identified] Rehab hospital/unit acute  Current DME Used/Available at Home: None    Hand dominance: Right    EXAMINATION OF PERFORMANCE DEFICITS:  Cognitive/Behavioral Status:  Neurologic State: Alert  Orientation Level: Oriented X4  Cognition: Decreased attention/concentration               Hearing: Auditory  Auditory Impairment: None    Vision/Perceptual:                                     Range of Motion:  AROM: Generally decreased, functional                         Strength:  Strength: Generally decreased, functional                Coordination:     Fine Motor Skills-Upper: Left Intact; Right Intact    Gross Motor Skills-Upper: Left Intact; Right Intact    Tone & Sensation:     Sensation: Intact                      Balance:  Sitting: Intact  Standing: Impaired; With support  Standing - Static: Fair;Constant support  Standing - Dynamic : Fair;Constant support    Functional Mobility and Transfers for ADLs:  Bed Mobility:  Supine to Sit: Minimum assistance    Transfers:  Sit to Stand: Minimum assistance  Stand to Sit: Minimum assistance      ADL Intervention and task modifications:       Grooming  Position Performed: Seated in chair  Washing Face: Supervision  Washing Hands: Supervision  Brushing Teeth: Supervision                                  Therapeutic Exercise:  Pt will benefit from BUE HEP to improve participation in ADLs and mobility. Plan will be initiated at next session. Functional Measure:    Shellie Singh -PAC \"6 Clicks\"                                                       Daily Activity Inpatient Short Form  How much help from another person does the patient currently need. .. Total; A Lot A Little None   1. Putting on and taking off regular lower body clothing? []  1 []  2 [x]  3 []  4   2. Bathing (including washing, rinsing, drying)? []  1 []  2 [x]  3 []  4   3. Toileting, which includes using toilet, bedpan or urinal? [] 1 []  2 [x]  3 []  4   4. Putting on and taking off regular upper body clothing? []  1 []  2 [x]  3 []  4   5. Taking care of personal grooming such as brushing teeth? []  1 []  2 []  3 [x]  4   6. Eating meals? []  1 []  2 []  3 [x]  4   © 2007, Trustees of Shellie Singh, under license to [employer]. All rights reserved     Score: 20/24     Interpretation of Tool:  Represents clinically-significant functional categories (i.e. Activities of daily living).   Percentage of Impairment CH    0%   CI    1-19% CJ    20-39% CK    40-59% CL    60-79% CM    80-99% CN     100%   [facility]  Score 6-24 24 23 20-22 15-19 10-14 7-9 6     Occupational Therapy Evaluation Charge Determination   History Examination Decision-Making   LOW Complexity : Brief history review  LOW Complexity : 1-3 performance deficits relating to physical, cognitive , or psychosocial skils that result in activity limitations and / or participation restrictions  MEDIUM Complexity : Patient may present with comorbidities that affect occupational performnce. Miniml to moderate modification of tasks or assistance (eg, physical or verbal ) with assesment(s) is necessary to enable patient to complete evaluation       Based on the above components, the patient evaluation is determined to be of the following complexity level: LOW   Pain Ratin/10 RLE    Activity Tolerance:   Fair    After treatment patient left in no apparent distress:    Sitting in chair and Call bell within reach, bed locked and in lowest position    COMMUNICATION/EDUCATION:   The patients plan of care was discussed with: Physical therapist and Registered nurse. Patient/family have participated as able in goal setting and plan of care. and Patient/family agree to work toward stated goals and plan of care. This patients plan of care is appropriate for delegation to [facility]. PT/OT sessions occurred together for increased safety of pt and clinician. Thank you for this referral.  Madeleine Resendez, OT  Time Calculation: 25 mins   Problem: Self Care Deficits Care Plan (Adult)  Goal: *Acute Goals and Plan of Care (Insert Text)  Description: FUNCTIONAL STATUS PRIOR TO ADMISSION: Pt was Ind with no AD PTA. HOME SUPPORT: Pt lives with son.     Occupational Therapy Goals  Initiated 3/8/2023    Pt stated goal: To return to PLOF  Pt will be Mod I sup <> sit in prep for EOB ADLs  Pt will be Mod I grooming standing sink side LRAD  Pt will be Mod I UB dressing sitting EOB/long sit   Pt will be Mod I LE dressing sitting EOB/long sit  Pt will be Mod I sit <>  prep for toileting LRAD  Pt will be Mod I toileting/toilet transfer/cloth mgmt LRAD    Outcome: Not Met

## 2023-03-08 NOTE — PROGRESS NOTES
PROGRESS NOTE      Subjective: Comfortable. No chest pain or shortness of breath. Postop day 1. Mild hip pain. Visit Vitals  /78 (BP 1 Location: Left upper arm, BP Patient Position: At rest;Semi fowlers)   Pulse 65   Temp 98.1 °F (36.7 °C)   Resp 18   Ht 5' 11\" (1.803 m)   Wt 61.2 kg (135 lb)   SpO2 96%   BMI 18.83 kg/m²       Current Facility-Administered Medications:     0.9% sodium chloride infusion, 50 mL/hr, IntraVENous, CONTINUOUS, Ace Chavez PA-C, Last Rate: 50 mL/hr at 03/08/23 0523, 50 mL/hr at 03/08/23 0523    carvediloL (COREG) tablet 25 mg, 25 mg, Oral, BID WITH MEALS, Mortimer Casco A, MD, 25 mg at 03/08/23 0901    acetaminophen (TYLENOL) tablet 1,000 mg, 1,000 mg, Oral, Q6H, Ace Chavez PA-C, 1,000 mg at 03/08/23 1325    traMADoL (ULTRAM) tablet 50 mg, 50 mg, Oral, Q6H PRN, Ace Chavez PA-C, 50 mg at 03/08/23 1630    celecoxib (CELEBREX) capsule 200 mg, 200 mg, Oral, BID, Ace Chavez PA-C, 200 mg at 03/08/23 0901    morphine injection 1 mg, 1 mg, IntraVENous, Q6H PRN, Ace Chavez PA-C, 1 mg at 03/07/23 2028    *Please  patient's home medications from pharmacy at discharge*, 1 Each, Other, PRIOR TO DISCHARGE, Kingsley Pop MD    ceFAZolin (ANCEF) 2 g in sterile water (preservative free) 20 mL IV syringe, 2 g, IntraVENous, Q8H, Ace Chavez PA-C, 2 g at 03/08/23 0621    enoxaparin (LOVENOX) injection 40 mg, 40 mg, SubCUTAneous, Q24H, Ace Chavez PA-C, 40 mg at 03/08/23 0904  No results found for this or any previous visit (from the past 24 hour(s)). XR FEMUR RT 2 VS   Final Result   Status post ORIF right intertrochanteric femur fracture as above. XR FLUOROSCOPY UNDER 60 MINUTES   Final Result   See above      CT HIP RT WO CONT   Final Result   1. Comminuted intertrochanteric right hip fracture   2. Severe degeneration of the right hip with osteopenia   3.  Massive hydroceles          DUPLEX LOWER EXT VENOUS BILAT    (Results Pending)             HEENT: Normocephalic atraumatic. No thrush. Neck: No distended neck vein. Lungs: Diminished breath sounds. No coarse crackles or wheeze. Heart: Regular. Abdomen: Soft positive bowel sounds. Lower Extremities: No edema. Right hip dressing applied. CNS: Alert and oriented follows command. Psych: Cooperative. Assessment:#1. Right hip fracture status post ORIF    #2. Status post fall    #3. Hypertension    #4. Chronic tobacco use possible underlying COPD asymptomatic    #5. Bilateral large hydrocele          Plan: Continue current care. For possible inpatient rehab. Fall precautions. Appreciate consultants. Stable for discharge once bed is available.

## 2023-03-08 NOTE — PROGRESS NOTES
Rounded with Dr. Mala Pathak, instructed this RN to place urology consult for hydrocele. Completed as ordered.

## 2023-03-09 PROCEDURE — 65270000029 HC RM PRIVATE

## 2023-03-09 PROCEDURE — 74011250636 HC RX REV CODE- 250/636: Performed by: PHYSICIAN ASSISTANT

## 2023-03-09 PROCEDURE — 93970 EXTREMITY STUDY: CPT | Performed by: SURGERY

## 2023-03-09 PROCEDURE — 74011250637 HC RX REV CODE- 250/637: Performed by: PHYSICIAN ASSISTANT

## 2023-03-09 PROCEDURE — 97116 GAIT TRAINING THERAPY: CPT

## 2023-03-09 PROCEDURE — 74011250637 HC RX REV CODE- 250/637: Performed by: INTERNAL MEDICINE

## 2023-03-09 PROCEDURE — 97530 THERAPEUTIC ACTIVITIES: CPT

## 2023-03-09 RX ORDER — HYDROCODONE BITARTRATE AND ACETAMINOPHEN 5; 325 MG/1; MG/1
1 TABLET ORAL
Status: DISCONTINUED | OUTPATIENT
Start: 2023-03-09 | End: 2023-03-13 | Stop reason: HOSPADM

## 2023-03-09 RX ADMIN — CARVEDILOL 25 MG: 12.5 TABLET, FILM COATED ORAL at 17:58

## 2023-03-09 RX ADMIN — CELECOXIB 200 MG: 200 CAPSULE ORAL at 21:05

## 2023-03-09 RX ADMIN — CARVEDILOL 25 MG: 12.5 TABLET, FILM COATED ORAL at 09:19

## 2023-03-09 RX ADMIN — ACETAMINOPHEN 1000 MG: 325 TABLET ORAL at 14:10

## 2023-03-09 RX ADMIN — HYDROCODONE BITARTRATE AND ACETAMINOPHEN 1 TABLET: 5; 325 TABLET ORAL at 21:05

## 2023-03-09 RX ADMIN — ACETAMINOPHEN 1000 MG: 325 TABLET ORAL at 07:15

## 2023-03-09 RX ADMIN — CELECOXIB 200 MG: 200 CAPSULE ORAL at 09:19

## 2023-03-09 RX ADMIN — ACETAMINOPHEN 1000 MG: 325 TABLET ORAL at 18:00

## 2023-03-09 RX ADMIN — SODIUM CHLORIDE 50 ML/HR: 9 INJECTION, SOLUTION INTRAVENOUS at 02:54

## 2023-03-09 RX ADMIN — ENOXAPARIN SODIUM 40 MG: 100 INJECTION SUBCUTANEOUS at 09:20

## 2023-03-09 NOTE — PROGRESS NOTES
PHYSICAL THERAPY TREATMENT  Patient: Mark Burroughs [de-identified]81 y.o. male)  Date: 3/9/2023  Diagnosis: Intertrochanteric fracture ([address] Naida Henderson <principal problem not specified>  Procedure(s) (LRB):  FEMUR GAMMA NAIL INSERTION ON RIGHT (Right) 2 Days Post-Op  Precautions: In place during session: None  Chart, physical therapy assessment, plan of care and goals were reviewed. ASSESSMENT  Patient continues with skilled PT services and is progressing towards goals. Pt seated in the chair upon PT arrival, agreeable to session. Pt in recliner upon arrival and was able to increase ambulation well while maintaining PWB with no difficulty. Pt noted to have fatigue at end of ambulation but otherwise tolerated well. Educated LE LAQ, AP and quad sets. Pt returned to bed with SBA. Will cont to further progress gait and mobility. (See below for objective details and assist levels). Overall pt tolerated session well today with improved mobility. Will continue to benefit from skilled PT services, and will continue to progress as tolerated. Current Level of Function Impacting Discharge (mobility/balance): weakness, CGA-min A     Other factors to consider for discharge: maintaining PWB, safety and assist at home          PLAN :  Patient continues to benefit from skilled intervention to address the above impairments. Continue treatment per established plan of care to address goals.       Recommendation for discharge: (in order for the patient to meet his/her long term goals)  [address]     This discharge recommendation:  Has been made in collaboration with the attending provider and/or case management    IF patient discharges home will need the following DME: to be determined (TBD)       SUBJECTIVE:   Patient stated It felt good to walk    OBJECTIVE DATA SUMMARY:   Critical Behavior:  Neurologic State: Alert  Orientation Level: Oriented X4  Cognition: Follows commands     Functional Mobility Training:  Bed Mobility:  Rolling: Stand-by assistance  Supine to Sit: Stand-by assistance  Sit to Supine: Stand-by assistance  Scooting: Stand-by assistance    Transfers:  Sit to Stand: Minimum assistance  Stand to Sit: Minimum assistance  Bed to Chair: Minimum assistance    Balance:  Sitting: Intact; Without support  Standing: Impaired; With support  Standing - Static: Constant support; Fair  Standing - Dynamic : Constant support; Fair  Ambulation/Gait Training:  Distance (ft): 30 Feet (ft)  Assistive Device: Gait belt;Walker, rolling  Ambulation - Level of Assistance: Contact guard assistance  Right Side Weight Bearing: Partial (%)      Therapeutic Exercises:   Educated Insplorion and 10x ankle pumps    Pain Ratin/10    Activity Tolerance:   Fair and requires rest breaks    After treatment patient left in no apparent distress:   Bed locked and returned to lowest position, Supine in bed and Call bell within reach    COMMUNICATION/COLLABORATION:   The patients plan of care was discussed with: CHARLES Skelton, PT   Time Calculation: 18 mins         Problem: Mobility Impaired (Adult and Pediatric)  Goal: *Acute Goals and Plan of Care (Insert Text)  Description: I with LE HEP x7 days  SBA with bed mob x7 days  SBA with all transfers PWB on R LE x 7 days  Amb 50-75ft with RW and PWB on R LE x 7 days    Pt stated goal: to get better  Outcome: Progressing Towards Goal

## 2023-03-09 NOTE — PROGRESS NOTES
Bedside shift change report given to Maciej Gavin RN and Carlos Pugh (oncoming nurse) by Pepe Lin (offgoing nurse). Report included the following information SBAR, MAR, and Quality Measures.

## 2023-03-09 NOTE — PROGRESS NOTES
UROLOGY Progress Note         [phone]      Daily Progress Note: 3/9/2023      Subjective: The patient is seen for UROLOGIC follow up on hydroceles  The patient is a 80 yo male is s/p ORIF from 3/7/2023  The patient  underwent   Ct scan on 3/7/2023 due to hip fracture  which  reveled massive hydroceles. The patient is seen at the bedside alert and oriented. He reports mild hip pain. The patient reports having hydrocele for many years and it had never bothered him. He does not desire any interventions, he wants to recover from his hip surgery . He denies any significant urological history. He denies any history of urgency,frequency or prostate cancer. He never was followed by urology   Problem List:  Patient Active Problem List   Diagnosis Code    Intertrochanteric fracture (HCC) S72.143A         Medications reviewed  Current Facility-Administered Medications   Medication Dose Route Frequency    HYDROcodone-acetaminophen (NORCO) 5-325 mg per tablet 1 Tablet  1 Tablet Oral Q4H PRN    carvediloL (COREG) tablet 25 mg  25 mg Oral BID WITH MEALS    acetaminophen (TYLENOL) tablet 1,000 mg  1,000 mg Oral Q6H    celecoxib (CELEBREX) capsule 200 mg  200 mg Oral BID    *Please  patient's home medications from pharmacy at discharge*  1 Each Other PRIOR TO DISCHARGE    enoxaparin (LOVENOX) injection 40 mg  40 mg SubCUTAneous Q24H       Review of Systems:   Review of Systems   Constitutional: Negative. HENT: Negative. Eyes: Negative. Respiratory: Negative. Cardiovascular: Negative. Gastrointestinal: Negative. Genitourinary: Negative. Musculoskeletal: Negative. Mild hip pain   Skin: Negative. Neurological: Negative. Endo/Heme/Allergies: Negative. Psychiatric/Behavioral: Negative. Objective:   Physical Exam  HENT:      Head: Normocephalic.       Nose: Nose normal.      Mouth/Throat:      Mouth: Mucous membranes are moist.   Eyes:      Pupils: Pupils are equal, round, and reactive to light. Abdominal:      General: Abdomen is flat. Palpations: Abdomen is soft. Genitourinary:     Comments: Large scrotal hydroceles. Musculoskeletal:      Comments: Right hip with dressing    Skin:     General: Skin is warm. Capillary Refill: Capillary refill takes less than 2 seconds. Neurological:      General: No focal deficit present. Mental Status: He is alert. Psychiatric:         Mood and Affect: Mood normal.        Visit Vitals  BP (!) 142/76 (BP 1 Location: Left upper arm, BP Patient Position: Semi fowlers; At rest)   Pulse 66   Temp 97.9 °F (36.6 °C)   Resp 18   Ht 5' 11\" (1.803 m)   Wt 135 lb (61.2 kg)   SpO2 96%   BMI 18.83 kg/m²         Data Review:       Recent Days:  Recent Labs     03/07/23 0218   WBC 11.1   HGB 10.0*   HCT 32.1*        Recent Labs     03/07/23 0218      K 3.5   *   CO2 26   *   BUN 18   CREA 1.16   CA 8.7   ALB 2.3*   TBILI 0.3   ALT 9*   INR 1.1       24 Hour Results:  No results found for this or any previous visit (from the past 24 hour(s)). Assessment/     Patient Active Problem List   Diagnosis Code    Intertrochanteric fracture ([facility] [address] S72.143A       Plan: 1. Hip fracture  He is s/p ORIF from 3/7/2023  Ortho gollowing  2. Hydroceles  Patient does not desire any intervention for now  He can follow up  with Dr. Deshaun Santo as soon as he recovers for hip fracture.     Care Plan discussed with: Dr. Vale Benson, Attending Physician      Mauricio Gee NP

## 2023-03-09 NOTE — PROGRESS NOTES
Problem: Pain  Goal: *Control of Pain  Outcome: Progressing Towards Goal     Problem: Falls - Risk of  Goal: *Absence of Falls  Description: Document Antonio Fall Risk and appropriate interventions in the flowsheet.   Outcome: Progressing Towards Goal  Note: Fall Risk Interventions:                                Problem: Infection - Risk of, Surgical Site Infection  Goal: *Absence of surgical site infection signs and symptoms  Outcome: Progressing Towards Goal     Problem: Patient Education: Go to Patient Education Activity  Goal: Patient/Family Education  Outcome: Progressing Towards Goal

## 2023-03-09 NOTE — PROGRESS NOTES
Physician Progress Note      PATIENTBertram Palomares  CSN #:                  [id number]  :                       1950  ADMIT DATE:       3/7/2023 12:53 AM  DISCH DATE:  Brian Valencia  PROVIDER #:        Erick Ying MD          QUERY TEXT:    Dear Dr. Nelly Herrera,    Pt admitted with hip fracture. Pt noted to have Osteoporotic bone. If possible, please document in progress notes and discharge summary if you are evaluating and/or treating any of the following: The medical record reflects the following:  Risk Factors: 81year old male, s/p fall x month ago, bedbound now  Clinical Indicators: 3/7 H&P - presents status post fall for most a month bedbound presents now found to have right hip fracture. 3/7 CT Right Hip - Severe degeneration of the right hip with osteopenia  3/7 OP note - Procedure(s): FEMUR GAMMA NAIL INSERTION ON RIGHT  Findings: Osteoporotic bone  Treatment: closed reduction and and placement of a long gamma nail to the right femur    Please email [email or web address] with any questions  Options provided:  -- Pathological right hip fracture  -- Pathological right hip fracture due to osteopenia  -- Pathological right hip fracture due to osteopenia following fall which would not usually break a normal, healthy bone  -- Osteoporotic right hop Fracture  -- Osteoporotic right hip fracture following fall which would not usually break a normal, healthy bone  -- Traumatic right hip fracture  -- Other - I will add my own diagnosis  -- Disagree - Not applicable / Not valid  -- Disagree - Clinically unable to determine / Unknown  -- Refer to Clinical Documentation Reviewer    PROVIDER RESPONSE TEXT:    This patient has a traumatic right hip fracture. Query created by:  Deepa Croft on 3/8/2023 12:52 PM      Electronically signed by:  Erick Ying MD 3/9/2023 4:48 PM

## 2023-03-09 NOTE — PROGRESS NOTES
OCCUPATIONAL THERAPY TREATMENT  Patient: Luciana Choi (71 y.o. male)  Date: 3/9/2023  Diagnosis: Intertrochanteric fracture ([address] Cathi  <principal problem not specified>  Procedure(s) (LRB):  FEMUR GAMMA NAIL INSERTION ON RIGHT (Right) 2 Days Post-Op  Precautions: In place during session: None  Chart, occupational therapy assessment, plan of care, and goals were reviewed. ASSESSMENT  Pt continues with skilled OT services and is progressing towards goals. Upon OLIVEIRA arrival, pt semi supine in bed and agreeable to tx session at this time. Overall, pt continues to present with deficits in generalized strength/AROM, coordination, bed mobility, static/dynamic sitting and standing balance and functional activity tolerance during performance of ADLs/mobility (see below for objective details and assist levels). Pt tolerated session fair this date with completion of bed mobility, transfers, and ADLs. Pt completed bed mobility with SBA and transfers with Dao overall. Pt ambulated using RW to bathroom with CGA using RW and completed transfer on and off of toilet with Dao. Pt educated on extending RLE to relieve pain during transfer and pt verbalized understanding. Pt required increased time for seated rest break due to reported dizziness. Pt completed transfer to recliner in room with Dao and completed seated grooming with setup A overall. Pt left seated in recliner with call bell within reach and needs met. Recommend d/c to [address]  once medically appropriate. Other factors to consider for discharge: family/social support, DME, time since onset, severity of deficits, decline from functional baseline         PLAN :  Patient continues to benefit from skilled intervention to address the above impairments. Continue treatment per established plan of care to address goals.     Recommendation for discharge: (in order for the patient to meet his/her long term goals)  Inpatient Rehabilitation Facility     This discharge recommendation:  Has been made in collaboration with the attending provider and/or case management    IF patient discharges home will need the following DME: TBD       SUBJECTIVE:   Patient stated I have been sleeping all day so I haven't been up.     OBJECTIVE DATA SUMMARY:   Cognitive/Behavioral Status:  Neurologic State: Alert  Orientation Level: Oriented X4  Cognition: Follows commands    Functional Mobility and Transfers for ADLs:  Bed Mobility:  Rolling: Stand-by assistance  Supine to Sit: Stand-by assistance  Scooting: Stand-by assistance    Transfers:  Sit to Stand: Minimum assistance  Functional Transfers  Bathroom Mobility: Minimum assistance  Toilet Transfer : Minimum assistance  Bed to Chair: Minimum assistance    Balance:  Sitting: Intact; Without support  Standing: Impaired; With support  Standing - Static: Constant support; Fair  Standing - Dynamic : Constant support; Fair    ADL Intervention:  Grooming  Position Performed: Seated in chair  Washing Face: Set-up  Brushing/Combing Hair: Set-up    Pain:  0/10    Activity Tolerance:   Fair and requires rest breaks  Please refer to the flowsheet for vital signs taken during this treatment. After treatment patient left in no apparent distress:   Bed locked and in lowest position  Sitting in chair and Call bell within reach    COMMUNICATION/COLLABORATION:   The patients plan of care was discussed with: Physical therapy assistant and Registered nurse. ROXANNE Sebastian  Time Calculation: 23 mins    Problem: Self Care Deficits Care Plan (Adult)  Goal: *Acute Goals and Plan of Care (Insert Text)  Description: FUNCTIONAL STATUS PRIOR TO ADMISSION: Pt was Ind with no AD PTA. HOME SUPPORT: Pt lives with son.     Occupational Therapy Goals  Initiated 3/8/2023    Pt stated goal: To return to PLOF  Pt will be Mod I sup <> sit in prep for EOB ADLs  Pt will be Mod I grooming standing sink side LRAD  Pt will be Mod I UB dressing sitting EOB/long sit   Pt will be Mod I LE dressing sitting EOB/long sit  Pt will be Mod I sit <>  prep for toileting LRAD  Pt will be Mod I toileting/toilet transfer/cloth mgmt LRAD    Outcome: Progressing Towards Goal

## 2023-03-09 NOTE — PROGRESS NOTES
Bedside shift change report given to Irene Callaway RN (oncoming nurse) by José Luis Looney RN (offgoing nurse). Report included the following information SBAR, Kardex, MAR, and Recent Results.

## 2023-03-09 NOTE — PROGRESS NOTES
Orthopedic progress note    Date:3/9/2023       Room:[facility]  Patient Name:Reji Fallon     YOB: 1950     Age:72 y.o. Subjective    79-year-old male status post ORIF right hip. Postop day #2. Patient is doing well. He is complaining of increased discomfort of his right hip as opposed to yesterday. He does feel his pain medication helps. He is participating tolerating the therapy well. No other complaints at this time. Objective           Vitals Last 24 Hours:  TEMPERATURE:  Temp  Av.1 °F (36.7 °C)  Min: 97.9 °F (36.6 °C)  Max: 98.1 °F (36.7 °C)  RESPIRATIONS RANGE: Resp  Av  Min: 18  Max: 18  PULSE OXIMETRY RANGE: SpO2  Av.3 %  Min: 94 %  Max: 96 %  PULSE RANGE: Pulse  Av.5  Min: 54  Max: 66  BLOOD PRESSURE RANGE: Systolic (95IQM), WIO:765 , Min:130 , PIL:111   ; Diastolic (40DZU), VLF:29, Min:76, Max:92    Current Facility-Administered Medications   Medication Dose Route Frequency    HYDROcodone-acetaminophen (NORCO) 5-325 mg per tablet 1 Tablet  1 Tablet Oral Q4H PRN    carvediloL (COREG) tablet 25 mg  25 mg Oral BID WITH MEALS    acetaminophen (TYLENOL) tablet 1,000 mg  1,000 mg Oral Q6H    celecoxib (CELEBREX) capsule 200 mg  200 mg Oral BID    *Please  patient's home medications from pharmacy at discharge*  1 Each Other PRIOR TO DISCHARGE    enoxaparin (LOVENOX) injection 40 mg  40 mg SubCUTAneous Q24H      Review of Systems   Constitutional: Negative for malaise/fatigue. Respiratory: Negative for cough, shortness of breath and wheezing. Cardiovascular: Negative for chest pain and palpitations. Gastrointestinal: Negative for abdominal pain, heartburn, nausea and vomiting. Neurological: Negative for headaches. Musculoskeletal: Denies any numbness/tingling of operative extremity     I/O (24Hr):     Intake/Output Summary (Last 24 hours) at 3/9/2023 1219  Last data filed at 3/9/2023 7133  Gross per 24 hour   Intake 900 ml   Output 1100 ml   Net -200 ml Objective:  Vital signs: (most recent): Blood pressure (!) 142/76, pulse 66, temperature 97.9 °F (36.6 °C), resp. rate 18, height 5' 11\" (1.803 m), weight 61.2 kg (135 lb), SpO2 96 %. Labs/Imaging/Diagnostics    Labs:  CBC:  Recent Labs     03/07/23 0218   WBC 11.1   RBC 3.67*   HGB 10.0*   HCT 32.1*   MCV 87.5   RDW 17.1*        CHEMISTRIES:  Recent Labs     03/07/23 0218      K 3.5   *   CO2 26   BUN 18   CREA 1.16   CA 8.7   PT/INR:  Recent Labs     03/07/23 0218   INR 1.1     APTT:No results for input(s): APTT in the last 72 hours. LIVER PROFILE:  Recent Labs     03/07/23 0218   AST 9*   ALT 9*     Lab Results   Component Value Date/Time    ALT (SGPT) 9 (L) 03/07/2023 02:18 AM    AST (SGOT) 9 (L) 03/07/2023 02:18 AM    Alk. phosphatase 75 03/07/2023 02:18 AM    Bilirubin, total 0.3 03/07/2023 02:18 AM       Physical Exam:  Right hip: Dressing was disheveled peeling. This was removed by me. Incision sites are healing well. No swelling seen of his right thigh. Minimal tenderness palpation throughout the right thigh. No calf pain to palpation. DP/PT pulse are palpable. EHL/DF/PF is 4 out of 5. Cap refill is 2 seconds. Right lower extremity neurovascular intact. Assessment//Plan           Patient Active Problem List    Diagnosis Date Noted    Intertrochanteric fracture ([facility] [address] 03/07/2023     Let us post ORIF right hip. Postop day #2. New Mepilex Ag dressing applied to proximal incision sites, OPTi foam placed to distal incision site. Continue with therapy as tolerated. Partial weightbearing right lower extremity  Continue with ice therapy as tolerated. Awaiting rehab placement. States [facility]. Okay to discharge from orthopedics when cleared by medicine and placement has been made. Patient follow-up with orthopedics as an outpatient in approximately 2 weeks.         Electronically signed by Emanuel Rosado PA-C on 3/9/2023 at 12:19 PM

## 2023-03-09 NOTE — PROGRESS NOTES
PROGRESS NOTE      Subjective: Patient reports more pain in the right hip today. No chest pain or shortness of breath. Poor appetite. Visit Vitals  BP (!) 142/76 (BP 1 Location: Left upper arm, BP Patient Position: Semi fowlers; At rest)   Pulse 66   Temp 97.9 °F (36.6 °C)   Resp 18   Ht 5' 11\" (1.803 m)   Wt 61.2 kg (135 lb)   SpO2 96%   BMI 18.83 kg/m²       Current Facility-Administered Medications:     HYDROcodone-acetaminophen (NORCO) 5-325 mg per tablet 1 Tablet, 1 Tablet, Oral, Q4H PRN, Kingsley Johns MD    carvediloL (COREG) tablet 25 mg, 25 mg, Oral, BID WITH MEALS, Kingsley Johns MD, 25 mg at 03/09/23 0919    acetaminophen (TYLENOL) tablet 1,000 mg, 1,000 mg, Oral, Q6H, Ace Chavez PA-C, 1,000 mg at 03/09/23 0715    celecoxib (CELEBREX) capsule 200 mg, 200 mg, Oral, BID, Ace Chavez PA-C, 200 mg at 03/09/23 0919    morphine injection 1 mg, 1 mg, IntraVENous, Q6H PRN, Ace Chavez PA-C, 1 mg at 03/07/23 2028    *Please  patient's home medications from pharmacy at discharge*, 1 Each, Other, PRIOR TO DISCHARGE, Kingsley Johns MD    enoxaparin (LOVENOX) injection 40 mg, 40 mg, SubCUTAneous, Q24H, Ace Chavez PA-C, 40 mg at 03/09/23 0920  No results found for this or any previous visit (from the past 24 hour(s)). DUPLEX LOWER EXT VENOUS BILAT   Final Result      XR FEMUR RT 2 VS   Final Result   Status post ORIF right intertrochanteric femur fracture as above. XR FLUOROSCOPY UNDER 60 MINUTES   Final Result   See above      CT HIP RT WO CONT   Final Result   1. Comminuted intertrochanteric right hip fracture   2. Severe degeneration of the right hip with osteopenia   3. Massive hydroceles                    HEENT: Normocephalic atraumatic. No thrush dry mucous membrane. Neck: No distended neck vein. Lungs: Diminished air entry. Barrel chest.  No coarse crackles or wheeze. Heart: Regular.   Abdomen: Soft nontender nondistended positive bowel sounds. Lower Extremities: No edema. Right hip dressing intact. CNS: Alert and oriented follows command. Psych: Cooperative. Assessment::#1. Right hip fracture status post ORIF  #2. Status post fall  #3. Hypertension  #4. Chronic tobacco use possible underlying COPD asymptomatic  #5. Bilateral large hydrocele          Plan: Discontinue tramadol. Start Conklin.  Awaiting inpatient rehab authorization. Discussed with nurse. Patient denies any scrotal pain and has had this hydrocele for a long time and reports its not bothering him.

## 2023-03-10 PROCEDURE — 97530 THERAPEUTIC ACTIVITIES: CPT

## 2023-03-10 PROCEDURE — 65270000029 HC RM PRIVATE

## 2023-03-10 PROCEDURE — 97116 GAIT TRAINING THERAPY: CPT

## 2023-03-10 PROCEDURE — 74011250637 HC RX REV CODE- 250/637: Performed by: PHYSICIAN ASSISTANT

## 2023-03-10 PROCEDURE — 74011250637 HC RX REV CODE- 250/637: Performed by: INTERNAL MEDICINE

## 2023-03-10 PROCEDURE — 74011250636 HC RX REV CODE- 250/636: Performed by: PHYSICIAN ASSISTANT

## 2023-03-10 RX ORDER — CELECOXIB 200 MG/1
200 CAPSULE ORAL DAILY
Qty: 30 CAPSULE | Refills: 2 | Status: SHIPPED | OUTPATIENT
Start: 2023-03-10 | End: 2023-06-08

## 2023-03-10 RX ORDER — ACETAMINOPHEN 500 MG
650 TABLET ORAL EVERY 6 HOURS
Qty: 100 TABLET | Refills: 0 | Status: SHIPPED | OUTPATIENT
Start: 2023-03-10 | End: 2023-03-25

## 2023-03-10 RX ORDER — HYDROCODONE BITARTRATE AND ACETAMINOPHEN 5; 325 MG/1; MG/1
1 TABLET ORAL
Qty: 12 TABLET | Refills: 0 | Status: SHIPPED | OUTPATIENT
Start: 2023-03-10 | End: 2023-03-13

## 2023-03-10 RX ADMIN — CELECOXIB 200 MG: 200 CAPSULE ORAL at 20:29

## 2023-03-10 RX ADMIN — CELECOXIB 200 MG: 200 CAPSULE ORAL at 08:47

## 2023-03-10 RX ADMIN — ENOXAPARIN SODIUM 40 MG: 100 INJECTION SUBCUTANEOUS at 08:48

## 2023-03-10 RX ADMIN — ACETAMINOPHEN 1000 MG: 325 TABLET ORAL at 00:12

## 2023-03-10 RX ADMIN — CARVEDILOL 25 MG: 12.5 TABLET, FILM COATED ORAL at 08:48

## 2023-03-10 RX ADMIN — CARVEDILOL 25 MG: 12.5 TABLET, FILM COATED ORAL at 16:31

## 2023-03-10 RX ADMIN — ACETAMINOPHEN 1000 MG: 325 TABLET ORAL at 18:03

## 2023-03-10 RX ADMIN — ACETAMINOPHEN 1000 MG: 325 TABLET ORAL at 05:39

## 2023-03-10 NOTE — PROGRESS NOTES
Patient is doing well. Minimal pain. On examination no calf tenderness bilaterally. Neurovascular intact both feet. Dressing is dry. Plan is partial weightbearing right lower limb. Walker. Discharge to rehab or home with a walker and physical therapy. Keep the wound covered at all times. Do not wet the wound. DVT prophylaxis per the attending. Follow-up 2 weeks in the orthopedic clinic.

## 2023-03-10 NOTE — PROGRESS NOTES
Bedside, Verbal, and Written shift change report given to Venancio Nguyen RN   (oncoming nurse) by Niki Karimi   (offgoing nurse). Report included the following information SBAR, Intake/Output, MAR, Recent Results, and Quality Measures.

## 2023-03-10 NOTE — PROGRESS NOTES
Problem: Pain  Goal: *Control of Pain  3/9/2023 2247 by Zina Bustamante RN  Outcome: Progressing Towards Goal  3/9/2023 2247 by Zina Bustamante RN  Outcome: Progressing Towards Goal     Problem: Falls - Risk of  Goal: *Absence of Falls  Description: Document Yosef Herndon Fall Risk and appropriate interventions in the flowsheet.   3/9/2023 2247 by Zina Bustamante RN  Outcome: Progressing Towards Goal  Note: Fall Risk Interventions:            Medication Interventions: Assess postural VS orthostatic hypotension, Patient to call before getting OOB, Teach patient to arise slowly    Elimination Interventions: Call light in reach, Patient to call for help with toileting needs, Toileting schedule/hourly rounds, Toilet paper/wipes in reach, Urinal in reach           3/9/2023 2247 by Zina Bustamante RN  Outcome: Progressing Towards Goal  Note: Fall Risk Interventions:            Medication Interventions: Assess postural VS orthostatic hypotension, Patient to call before getting OOB, Teach patient to arise slowly    Elimination Interventions: Call light in reach, Patient to call for help with toileting needs, Toileting schedule/hourly rounds, Toilet paper/wipes in reach, Urinal in reach              Problem: Infection - Risk of, Surgical Site Infection  Goal: *Absence of surgical site infection signs and symptoms  Outcome: Progressing Towards Goal     Problem: Discharge Planning  Goal: *Discharge to safe environment  Outcome: Progressing Towards Goal

## 2023-03-10 NOTE — PROGRESS NOTES
PHYSICAL THERAPY TREATMENT  Patient: Kamlesh Savage [de-identified]81 y.o. male)  Date: 3/10/2023  Diagnosis: Intertrochanteric fracture ([address] María Hagen <principal problem not specified>  Procedure(s) (LRB):  FEMUR GAMMA NAIL INSERTION ON RIGHT (Right) 3 Days Post-Op  Precautions: In place during session: None  Chart, physical therapy assessment, plan of care and goals were reviewed. ASSESSMENT  Patient continues with skilled PT services and is progressing towards goals. Pt seated in bed upon PT arrival, agreeable to session. Pt progressing well today with mobility and demos low pain at this time. Patient amb approx 40 ft and encouraged to amb further however pt declined. Patient demos no LOB during gait and maintained PWB well. Patient educated on LE therex and completed well with no difficulty. Patient would benefit from IRF to improve functional mobility and safety prior to returning home. (See below for objective details and assist levels). Overall pt tolerated session well today with improved mobility. Will continue to benefit from skilled PT services, and will continue to progress as tolerated. Current Level of Function Impacting Discharge (mobility/balance): weakness, unsteady gait    Other factors to consider for discharge: safety, assist at home, PLOF         PLAN :  Patient continues to benefit from skilled intervention to address the above impairments. Continue treatment per established plan of care to address goals.     Recommend with staff: Out of bed to chair for meals and Amb in hallway    Recommendation for discharge: (in order for the patient to meet his/her long term goals)  [address]     This discharge recommendation:  Has been made in collaboration with the attending provider and/or case management    IF patient discharges home will need the following DME: to be determined (TBD)       SUBJECTIVE:   Patient stated I am just trying to walk    OBJECTIVE DATA SUMMARY:   Critical Behavior:  Neurologic State: Alert  Orientation Level: Oriented X4  Cognition: Follows commands, Appropriate decision making     Functional Mobility Training:  Bed Mobility:  Supine to Sit: Stand-by assistance  Scooting: Stand-by assistance    Transfers:  Sit to Stand: Minimum assistance  Stand to Sit: Minimum assistance    Balance:  Sitting: Intact  Standing: Impaired; With support  Standing - Static: Constant support;Good  Standing - Dynamic : Constant support;Good  Ambulation/Gait Training:  Distance (ft): 40 Feet (ft)  Assistive Device: Gait belt;Walker, rolling  Ambulation - Level of Assistance: Contact guard assistance  Right Side Weight Bearing: Partial (%)      Therapeutic Exercises:       EXERCISE   Sets   Reps   Active Active Assist   Passive Self ROM   Comments   Ankle Pumps  10 [x] [] [] []    Quad Sets  10 [x] [] [] []    Long Arc Quads  10 [x] [] [] []       Pain Rating:  3-4/10 with activity  0/10 rest    Activity Tolerance:   Fair    After treatment patient left in no apparent distress:   Bed locked and returned to lowest position, Sitting in chair and Call bell within reach    COMMUNICATION/COLLABORATION:   The patients plan of care was discussed with: Occupational therapy assistant and ortho PA .       Carson Ramos, PT   Time Calculation: 17 mins         Problem: Mobility Impaired (Adult and Pediatric)  Goal: *Acute Goals and Plan of Care (Insert Text)  Description: I with LE HEP x7 days  SBA with bed mob x7 days  SBA with all transfers PWB on R LE x 7 days  Amb 50-75ft with RW and PWB on R LE x 7 days    Pt stated goal: to get better  Outcome: Progressing Towards Goal

## 2023-03-10 NOTE — PROGRESS NOTES
Bedside shift change report given to [facility] MAEGAN (oncoming nurse) by Rony Arguello (offgoing nurse). Report included the following information SBAR, Kardex, MAR, and Recent Results.

## 2023-03-10 NOTE — DISCHARGE SUMMARY
Discharge Summary     Stephane Jernigan     Admit Date:   3/7/2023 12:53 AM  Discharge Date:   3/10/2023  Discharge Condition:   Improved, stable  Discharge Diagnosis  Problem List Items Addressed This Visit          Skeletal    Intertrochanteric fracture ([address] - Primary    Relevant Medications    HYDROcodone-acetaminophen (NORCO) 5-325 mg per tablet     Other Visit Diagnoses       Nondisp intertroch fx right femur, init for opn fx type I/2 ([address]        Relevant Orders    CALL MD    ACTIVITY AFTER DISCHARGE    DRESSING, CHANGE SPECIFY        :#1. Right hip fracture status post ORIF    #2. Status post fall    #3. Hypertension    #4. Chronic tobacco use possible underlying COPD asymptomatic    #5. Bilateral large hydrocele     Hospital Stay  Narrative of Hospital Course: See H&P for full details. Briefly this is a 72-year-old  male with history of hypertension chronic tobacco use presents status post fall admitted for right intra trochanteric fracture. Hospital course. Patient was admitted. Consultation was obtained with orthopedics. He underwent uneventful ORIF of his right hip fracture. Pain medication given. He had physical therapy. He had nicotine replacement therapy. He is waiting for inpatient rehab. Consultants:    Orthopedics. Surgeries/procedures Performed:  Right hip ORIF         Discharge Plan:    Rehab Facility     Hospital/Incidental Findings Requiring Follow Up:     Patient Instructions:       Diet: DIET ADULT     Activity: As tolerated with physical therapy  For number of days (if applicable): Other Instructions: Fall precautions. Avoid tobacco use. Provider Follow-Up: Follow-up with orthopedics in about 2 weeks    Follow-up Appointments   Procedures    FOLLOW UP VISIT Appointment in: Two Weeks With orthopedics     With orthopedics     Standing Status:   Standing     Number of Occurrences:   1     Order Specific Question:   Appointment in     Answer:    Two Weeks        Significant Diagnostic Studies:     DUPLEX LOWER EXT VENOUS BILAT   Final Result      XR FEMUR RT 2 VS   Final Result   Status post ORIF right intertrochanteric femur fracture as above. XR FLUOROSCOPY UNDER 60 MINUTES   Final Result   See above      CT HIP RT WO CONT   Final Result   1. Comminuted intertrochanteric right hip fracture   2. Severe degeneration of the right hip with osteopenia   3. Massive hydroceles              No results found for this or any previous visit (from the past 24 hour(s)). Discharge Medications:  Current Discharge Medication List        START taking these medications    Details   celecoxib (CELEBREX) 200 mg capsule Take 1 Capsule by mouth daily for 90 days. Qty: 30 Capsule, Refills: 2  Start date: 3/10/2023, End date: 6/8/2023      acetaminophen (TYLENOL) 500 mg tablet Take 1.5 Tablets by mouth every six (6) hours for 15 days. Indications: pain  Qty: 100 Tablet, Refills: 0  Start date: 3/10/2023, End date: 3/25/2023      HYDROcodone-acetaminophen (NORCO) 5-325 mg per tablet Take 1 Tablet by mouth every six (6) hours as needed for Pain for up to 3 days. Max Daily Amount: 3 Tablets. Qty: 12 Tablet, Refills: 0  Start date: 3/10/2023, End date: 3/13/2023    Associated Diagnoses: Closed nondisplaced intertrochanteric fracture of right femur, initial encounter ([facility] [address]           CONTINUE these medications which have NOT CHANGED    Details   carvediloL (COREG) 25 mg tablet Take 25 mg by mouth two (2) times daily (with meals). Time Spent on Discharge: Discussed with nurse and patient.

## 2023-03-10 NOTE — PROGRESS NOTES
OCCUPATIONAL THERAPY TREATMENT  Patient: Pablito Estrella (71 y.o. male)  Date: 3/10/2023  Diagnosis: Intertrochanteric fracture ([address] Sravan Manning <principal problem not specified>  Procedure(s) (LRB):  FEMUR GAMMA NAIL INSERTION ON RIGHT (Right) 3 Days Post-Op  Precautions: In place during session: None  Chart, occupational therapy assessment, plan of care, and goals were reviewed. ASSESSMENT  Pt continues with skilled OT services and is progressing towards goals. Upon OLIVEIRA arrival, pt sitting in recliner and agreeable to tx session at this time. Overall, pt continues to present with deficits in generalized strength/AROM, coordination, bed mobility, static/dynamic sitting and standing balance and functional activity tolerance during performance of ADLs/mobility (see below for objective details and assist levels). Pt tolerated session fair this date with completion of transfers and ADLs. Pt completed transfers with Dao overall from recliner and toilet using RW for balance upon standing. Pt completed standing grooming at sink with Dao for balance due to pt having few LOB while standing with completion of dynamic activity. Pt completed donning/doffing of clean brief with ModA due to unable to complete bending to thread BLE. Pt completed seated bowel hygiene with SBA. Pt returned to recliner in room and left seated with call bell within reach and needs met. Recommend d/c to [address]  once medically appropriate. Other factors to consider for discharge: family/social support, DME, time since onset, severity of deficits, decline from functional baseline         PLAN :  Patient continues to benefit from skilled intervention to address the above impairments. Continue treatment per established plan of care to address goals.     Recommendation for discharge: (in order for the patient to meet his/her long term goals)  [address]     This discharge recommendation:  Has been made in collaboration with the attending provider and/or case management    IF patient discharges home will need the following DME: TBD       SUBJECTIVE:   Patient stated I haven't brushed my teeth. .I only have 3 teeth.     OBJECTIVE DATA SUMMARY:   Cognitive/Behavioral Status:  Neurologic State: Alert     Cognition: Follows commands    Functional Mobility and Transfers for ADLs:  Bed Mobility:  Supine to Sit: Stand-by assistance  Scooting: Stand-by assistance    Transfers:  Sit to Stand: Minimum assistance  Functional Transfers  Bathroom Mobility: Minimum assistance  Toilet Transfer : Minimum assistance    Balance:  Sitting: Intact; Without support  Standing: Impaired; With support  Standing - Static: Constant support;Good  Standing - Dynamic : Constant support;Good    ADL Intervention:  Grooming  Position Performed: Standing  Washing Face: Minimum assistance  Brushing Teeth: Minimum assistance    Lower Body Dressing Assistance  Underpants: Minimum assistance; Moderate assistance  Leg Crossed Method Used: No  Position Performed: Other (comment) (seated on toilet)    Toileting  Bladder Hygiene: Stand-by assistance  Bowel Hygiene: Stand-by assistance    Pain:  0/10    Activity Tolerance:   Fair and requires rest breaks  Please refer to the flowsheet for vital signs taken during this treatment. After treatment patient left in no apparent distress:   Bed locked and in lowest position  Sitting in chair and Call bell within reach    COMMUNICATION/COLLABORATION:   The patients plan of care was discussed with: Physical therapy assistant and Registered nurse. ROXANNE Sebastian  Time Calculation: 29 mins    Problem: Self Care Deficits Care Plan (Adult)  Goal: *Acute Goals and Plan of Care (Insert Text)  Description: FUNCTIONAL STATUS PRIOR TO ADMISSION: Pt was Ind with no AD PTA. HOME SUPPORT: Pt lives with son.     Occupational Therapy Goals  Initiated 3/8/2023    Pt stated goal: To return to PLOF  Pt will be Mod I sup <> sit in prep for EOB ADLs  Pt will be Mod I grooming standing sink side LRAD  Pt will be Mod I UB dressing sitting EOB/long sit   Pt will be Mod I LE dressing sitting EOB/long sit  Pt will be Mod I sit <>  prep for toileting LRAD  Pt will be Mod I toileting/toilet transfer/cloth mgmt LRAD    Outcome: Progressing Towards Goal

## 2023-03-10 NOTE — PROGRESS NOTES
Orthopedic progress note    Date:3/10/2023       Room:[facility]  Patient Name:Reji Fallon     YOB: 1950     Age:72 y.o. Subjective    Status post ORIF right hip. Postop day #3. Patient doing well. Complains of mild soreness of his right hip. He does feels pain medication helps. Tolerating therapy well. No other complaints. Objective           Vitals Last 24 Hours:  TEMPERATURE:  Temp  Av.7 °F (36.5 °C)  Min: 97.5 °F (36.4 °C)  Max: 98.1 °F (36.7 °C)  RESPIRATIONS RANGE: Resp  Av.8  Min: 17  Max: 18  PULSE OXIMETRY RANGE: SpO2  Av.5 %  Min: 95 %  Max: 97 %  PULSE RANGE: Pulse  Av.5  Min: 61  Max: 69  BLOOD PRESSURE RANGE: Systolic (28WLA), RBN:830 , Min:145 , QWX:920   ; Diastolic (96RRM), QTP:20, Min:77, Max:93    Current Facility-Administered Medications   Medication Dose Route Frequency    HYDROcodone-acetaminophen (NORCO) 5-325 mg per tablet 1 Tablet  1 Tablet Oral Q4H PRN    carvediloL (COREG) tablet 25 mg  25 mg Oral BID WITH MEALS    acetaminophen (TYLENOL) tablet 1,000 mg  1,000 mg Oral Q6H    celecoxib (CELEBREX) capsule 200 mg  200 mg Oral BID    *Please  patient's home medications from pharmacy at discharge*  1 Each Other PRIOR TO DISCHARGE    enoxaparin (LOVENOX) injection 40 mg  40 mg SubCUTAneous Q24H          I/O (24Hr): Intake/Output Summary (Last 24 hours) at 3/10/2023 1012  Last data filed at 3/10/2023 0050  Gross per 24 hour   Intake 200 ml   Output 700 ml   Net -500 ml     Objective:  Vital signs: (most recent): Blood pressure (!) 159/93, pulse 69, temperature 98.1 °F (36.7 °C), resp. rate 18, height 5' 11\" (1.803 m), weight 61.2 kg (135 lb), SpO2 97 %. Labs/Imaging/Diagnostics    Labs:  CBC:No results for input(s): WBC, RBC, HGB, HCT, MCV, RDW, PLT, HGBEXT, HCTEXT, PLTEXT in the last 72 hours. CHEMISTRIES:No results for input(s): NA, K, CL, CO2, BUN, CREA, CA, PHOS, MG in the last 72 hours.     No lab exists for component: GLUCOSEPT/INR:No results for input(s): INR, INREXT in the last 72 hours. No lab exists for component: PROTIME  APTT:No results for input(s): APTT in the last 72 hours. LIVER PROFILE:No results for input(s): AST, ALT in the last 72 hours. No lab exists for component: BILIDIR, BILITOT, ALKPHOS  Lab Results   Component Value Date/Time    ALT (SGPT) 9 (L) 03/07/2023 02:18 AM    AST (SGOT) 9 (L) 03/07/2023 02:18 AM    Alk. phosphatase 75 03/07/2023 02:18 AM    Bilirubin, total 0.3 03/07/2023 02:18 AM       Physical Exam:  Right hip: Proximal dressing Mepilex Ag remains in place. Clean dry and intact. Distal dressing has fallen off. Incision site is healing well. Assessment//Plan           Patient Active Problem List    Diagnosis Date Noted    Intertrochanteric fracture ([address] 03/07/2023     Status post ORIF right hip. Postop day #3. Continue with therapy as tolerated. Awaiting rehab placement. Okay to discharge from orthopedics when cleared by medicine and placement has been made. Patient will follow up with orthopedics in approximately 2 weeks.       Electronically signed by Clayton Sow PA-C on 3/10/2023 at 10:12 AM

## 2023-03-11 PROCEDURE — 74011250637 HC RX REV CODE- 250/637: Performed by: PHYSICIAN ASSISTANT

## 2023-03-11 PROCEDURE — 97530 THERAPEUTIC ACTIVITIES: CPT

## 2023-03-11 PROCEDURE — 74011250637 HC RX REV CODE- 250/637: Performed by: INTERNAL MEDICINE

## 2023-03-11 PROCEDURE — 74011250636 HC RX REV CODE- 250/636: Performed by: PHYSICIAN ASSISTANT

## 2023-03-11 PROCEDURE — 65270000029 HC RM PRIVATE

## 2023-03-11 RX ORDER — AMLODIPINE BESYLATE 5 MG/1
5 TABLET ORAL DAILY
Status: DISCONTINUED | OUTPATIENT
Start: 2023-03-11 | End: 2023-03-13

## 2023-03-11 RX ORDER — CELECOXIB 200 MG/1
200 CAPSULE ORAL DAILY
Status: DISCONTINUED | OUTPATIENT
Start: 2023-03-12 | End: 2023-03-13 | Stop reason: HOSPADM

## 2023-03-11 RX ADMIN — CELECOXIB 200 MG: 200 CAPSULE ORAL at 08:45

## 2023-03-11 RX ADMIN — ENOXAPARIN SODIUM 40 MG: 100 INJECTION SUBCUTANEOUS at 08:45

## 2023-03-11 RX ADMIN — ACETAMINOPHEN 1000 MG: 325 TABLET ORAL at 05:27

## 2023-03-11 RX ADMIN — CARVEDILOL 25 MG: 12.5 TABLET, FILM COATED ORAL at 16:35

## 2023-03-11 RX ADMIN — CARVEDILOL 25 MG: 12.5 TABLET, FILM COATED ORAL at 08:45

## 2023-03-11 RX ADMIN — AMLODIPINE BESYLATE 5 MG: 5 TABLET ORAL at 12:53

## 2023-03-11 RX ADMIN — ACETAMINOPHEN 1000 MG: 325 TABLET ORAL at 18:02

## 2023-03-11 RX ADMIN — ACETAMINOPHEN 1000 MG: 325 TABLET ORAL at 12:36

## 2023-03-11 RX ADMIN — ACETAMINOPHEN 1000 MG: 325 TABLET ORAL at 00:18

## 2023-03-11 NOTE — PROGRESS NOTES
Problem: Mobility Impaired (Adult and Pediatric)  Goal: *Acute Goals and Plan of Care (Insert Text)  Description: I with LE HEP x7 days  SBA with bed mob x7 days  SBA with all transfers PWB on R LE x 7 days  Amb 50-75ft with RW and PWB on R LE x 7 days    Pt stated goal: to get better  Outcome: Progressing Towards Goal   PHYSICAL THERAPY TREATMENT  Patient: Tonny Magana [de-identified]81 y.o. male)  Date: 3/11/2023  Diagnosis: Intertrochanteric fracture ([address] Sherie Garibay <principal problem not specified>  Procedure(s) (LRB):  FEMUR GAMMA NAIL INSERTION ON RIGHT (Right) 4 Days Post-Op  Precautions: In place during session: None  Chart, physical therapy assessment, plan of care and goals were reviewed. ASSESSMENT  Patient continues with skilled PT services and is progressing towards goals. Pt semi supine in bed upon PT arrival, agreeable to session. (See below for objective details and assist levels). Overall pt tolerated session well today with ambulation trial, stair negotiation, and seated LE TE. Some discomfort with hip flexion and weight bearing with ambulation. Verbal cue for stair negotiation with 2 hand rail assist. Will continue to benefit from skilled PT services, and will continue to progress as tolerated. Current Level of Function Impacting Discharge (mobility/balance): pain, strength, endurance, balance    Other factors to consider for discharge: none         PLAN :  Patient continues to benefit from skilled intervention to address the above impairments. Continue treatment per established plan of care to address goals.     Recommend with staff: Out of bed to chair for meals, Encourage HEP in prep for ADLs/mobility, and Amb to bathroom for toileting with gt belt and AD    Recommendation for discharge: (in order for the patient to meet his/her long term goals)  [address]     This discharge recommendation:  Has been made in collaboration with the attending provider and/or case management    IF patient discharges home will need the following DME: rolling walker       SUBJECTIVE:   Patient stated im alright.     OBJECTIVE DATA SUMMARY:   Critical Behavior:  Neurologic State: Alert  Orientation Level: Oriented X4  Cognition: Follows commands     Functional Mobility Training:  Bed Mobility:  Rolling: Modified independent  Supine to Sit: Modified independent  Sit to Supine: Modified independent  Scooting: Modified independent        Transfers:  Sit to Stand: Stand-by assistance  Stand to Sit: Stand-by assistance  Stand Pivot Transfers: Stand-by assistance                          Balance:  Sitting: Intact  Standing: Impaired; With support  Standing - Static: Good;Constant support  Standing - Dynamic : Fair;Constant support  Ambulation/Gait Training:  Distance (ft): 400 Feet (ft)  Assistive Device: Walker, rolling  Ambulation - Level of Assistance: Stand-by assistance                                               Stairs: Therapeutic Exercises:       EXERCISE   Sets   Reps   Active Active Assist   Passive Self ROM   Comments   Ankle Pumps  2 min [x] [] [] []    Quad Sets/Glut Sets   [] [] [] []    Hamstring Sets   [] [] [] []    Short Arc Quads   [] [] [] []    Heel Slides   [] [] [] []    Straight Leg Raises   [] [] [] []    Hip abd/add  2 min [x] [] [] []    Long Arc Quads  2 min [x] [] [] []    Marching  2 min [x] [] [] []       [] [] [] []       Pain Ratin    Activity Tolerance:   Good    After treatment patient left in no apparent distress:   Bed locked and returned to lowest position, Sitting in chair and Call bell within reach    GOALS:      COMMUNICATION/COLLABORATION:   The patients plan of care was discussed with: Physical therapy assistant.          Edward Connell, PTA, PT   Time Calculation: 35 mins

## 2023-03-11 NOTE — PROGRESS NOTES
DC Plan: [facility] (auth approved)    CM messaged [facility] via [facility]URT to see if pt can come to them today. Awaiting response.     ___________________________________________________    Cm received a response via BURT from [facility] indicating they don't have any beds available at this time, however if something comes up they will notify CM.

## 2023-03-11 NOTE — PROGRESS NOTES
Problem: Pain  Goal: *Control of Pain  Outcome: Progressing Towards Goal     Problem: Falls - Risk of  Goal: *Absence of Falls  Description: Document Antonio Fall Risk and appropriate interventions in the flowsheet.   Outcome: Progressing Towards Goal  Note: Fall Risk Interventions:            Medication Interventions: Assess postural VS orthostatic hypotension    Elimination Interventions: Call light in reach              Problem: Infection - Risk of, Surgical Site Infection  Goal: *Absence of surgical site infection signs and symptoms  Outcome: Progressing Towards Goal

## 2023-03-11 NOTE — PROGRESS NOTES
Bedside, Verbal, and Written shift change report given to Sergey Jeong   (oncoming nurse) by Andrea Mclean   (offgoing nurse). Report included the following information SBAR, Intake/Output, MAR, Recent Results, and Quality Measures.

## 2023-03-11 NOTE — PROGRESS NOTES
PROGRESS NOTE      Subjective: Feeling okay. Currently with physical therapy. At bedside doing some leg exercises. Uneventful night. No chest pain or shortness of breath. Visit Vitals  BP (!) 160/92 (BP 1 Location: Left upper arm, BP Patient Position: At rest;Semi fowlers)   Pulse 61   Temp 98.4 °F (36.9 °C)   Resp 18   Ht 5' 11\" (1.803 m)   Wt 61.2 kg (135 lb)   SpO2 96%   BMI 18.83 kg/m²       Current Facility-Administered Medications:     HYDROcodone-acetaminophen (NORCO) 5-325 mg per tablet 1 Tablet, 1 Tablet, Oral, Q4H PRN, Berenice QUINTANILLA MD, 1 Tablet at 03/09/23 2105    carvediloL (COREG) tablet 25 mg, 25 mg, Oral, BID WITH MEALS, Kingsley Guzman MD, 25 mg at 03/11/23 0845    acetaminophen (TYLENOL) tablet 1,000 mg, 1,000 mg, Oral, Q6H, Ace Chavez PA-C, 1,000 mg at 03/11/23 1236    celecoxib (CELEBREX) capsule 200 mg, 200 mg, Oral, BID, Ace Chavez PA-C, 200 mg at 03/11/23 0845    *Please  patient's home medications from pharmacy at discharge*, 1 Each, Other, PRIOR TO DISCHARGE, Kingsley Guzman MD    enoxaparin (LOVENOX) injection 40 mg, 40 mg, SubCUTAneous, Q24H, Ace Chavez PA-C, 40 mg at 03/11/23 0845  No results found for this or any previous visit (from the past 24 hour(s)). DUPLEX LOWER EXT VENOUS BILAT   Final Result      XR FEMUR RT 2 VS   Final Result   Status post ORIF right intertrochanteric femur fracture as above. XR FLUOROSCOPY UNDER 60 MINUTES   Final Result   See above      CT HIP RT WO CONT   Final Result   1. Comminuted intertrochanteric right hip fracture   2. Severe degeneration of the right hip with osteopenia   3. Massive hydroceles                    HEENT: Normocephalic atraumatic anicteric sclera no teeth no thrush. Neck: No distended neck vein. Lungs: Generally clear without any wheeze or crackles  Heart: Regular. Abdomen: Soft positive bowel sounds. Lower Extremities: Mild swelling right leg. .  Dressing is clean dry and intact. CNS: Alert and oriented follows command moves extremities. Psych: Cooperative. Assessment: #1. Right hip fracture status post ORIF doing better  #2. Status post fall. #3. Hypertension  #4. Chronic tobacco use with possible underlying COPD  #5. Bilateral large hydrocele          Plan: Discharge planning to rehab. Continue physical therapy. Pain control. We will add a low-dose Norvasc for better blood pressure control. Not applicable

## 2023-03-12 PROCEDURE — 97116 GAIT TRAINING THERAPY: CPT

## 2023-03-12 PROCEDURE — 74011250637 HC RX REV CODE- 250/637: Performed by: INTERNAL MEDICINE

## 2023-03-12 PROCEDURE — 74011250636 HC RX REV CODE- 250/636: Performed by: PHYSICIAN ASSISTANT

## 2023-03-12 PROCEDURE — 74011250637 HC RX REV CODE- 250/637: Performed by: PHYSICIAN ASSISTANT

## 2023-03-12 PROCEDURE — 65270000029 HC RM PRIVATE

## 2023-03-12 RX ADMIN — ACETAMINOPHEN 1000 MG: 325 TABLET ORAL at 18:00

## 2023-03-12 RX ADMIN — CELECOXIB 200 MG: 200 CAPSULE ORAL at 08:01

## 2023-03-12 RX ADMIN — ACETAMINOPHEN 1000 MG: 325 TABLET ORAL at 06:01

## 2023-03-12 RX ADMIN — CARVEDILOL 25 MG: 12.5 TABLET, FILM COATED ORAL at 17:09

## 2023-03-12 RX ADMIN — ACETAMINOPHEN 1000 MG: 325 TABLET ORAL at 00:42

## 2023-03-12 RX ADMIN — ENOXAPARIN SODIUM 40 MG: 100 INJECTION SUBCUTANEOUS at 08:01

## 2023-03-12 RX ADMIN — AMLODIPINE BESYLATE 5 MG: 5 TABLET ORAL at 08:01

## 2023-03-12 RX ADMIN — CARVEDILOL 25 MG: 12.5 TABLET, FILM COATED ORAL at 07:53

## 2023-03-12 RX ADMIN — ACETAMINOPHEN 1000 MG: 325 TABLET ORAL at 12:00

## 2023-03-12 NOTE — PROGRESS NOTES
DC order noted. Chart reviewed. The pt has been accepted for post hospital care at [facility] and [address] [place] [address]. There were no beds available for this pt on Sat 03/11. Message sent for bed status this date. Will update in a separate note, their response.   Delay entered

## 2023-03-12 NOTE — PROGRESS NOTES
Problem: Pain  Goal: *Control of Pain  Outcome: Progressing Towards Goal     Problem: Falls - Risk of  Goal: *Absence of Falls  Description: Document Antonio Fall Risk and appropriate interventions in the flowsheet.   Outcome: Progressing Towards Goal  Note: Fall Risk Interventions:            Medication Interventions: Patient to call before getting OOB    Elimination Interventions: Patient to call for help with toileting needs, Call light in reach              Problem: Infection - Risk of, Surgical Site Infection  Goal: *Absence of surgical site infection signs and symptoms  Outcome: Progressing Towards Goal

## 2023-03-12 NOTE — PROGRESS NOTES
PROGRESS NOTE      Chief Complaints:  Chief Complaint   Patient presents with    Transfer Of Care      Status post long gamma nail to the right femur    HPI and  Objective:    Patient is doing well. The pain is very well controlled. The patient is more more mobile. Past Medical History:   Diagnosis Date    Hypertension        History reviewed. No pertinent surgical history. History reviewed. No pertinent family history. Social History     Socioeconomic History    Marital status: SINGLE     Spouse name: Not on file    Number of children: Not on file    Years of education: Not on file    Highest education level: Not on file   Occupational History    Not on file   Tobacco Use    Smoking status: Every Day     Packs/day: 0.50     Types: Cigarettes    Smokeless tobacco: Never   Substance and Sexual Activity    Alcohol use: Never    Drug use: Never    Sexual activity: Not on file   Other Topics Concern    Not on file   Social History Narrative    Not on file     Social Determinants of Health     Financial Resource Strain: Not on file   Food Insecurity: Not on file   Transportation Needs: Not on file   Physical Activity: Not on file   Stress: Not on file   Social Connections: Not on file   Intimate Partner Violence: Not on file   Housing Stability: Not on file       Review of Systems:  Rest of review of system reviewed personally and they are negative. EXAM:  Visit Vitals  /79 (BP 1 Location: Right upper arm, BP Patient Position: At rest)   Pulse 66   Temp 98.1 °F (36.7 °C)   Resp 18   Ht 5' 11\" (1.803 m)   Wt 135 lb (61.2 kg)   SpO2 96%   BMI 18.83 kg/m²   Dressing is dry. Neurovascular intact both feet. No calf tenderness bilaterally. Patient is awake   Head and neck atraumatic, normocephalic. ENT: No hoarse voice, gaze appropriate. Cardiac system regular rate rhythm. Pulmonary no audible wheeze, no cyanosis.   Chest wall excursion normal with respiration cycle  Abdomen is soft not particularly distended. Neurologically nonfocal.  Hematology system: No bruising noted. Musculoskeletal system: No joint deformity noted. Genitourinary system: No hematuria noted. Skin is warm and moist.  Psychosocial: Cooperative. Vascular examination as previously noted no changes. Current Facility-Administered Medications   Medication Dose Route Frequency Provider Last Rate Last Admin    [START ON 3/12/2023] celecoxib (CELEBREX) capsule 200 mg  200 mg Oral DAILY Linus QUINTANILLA MD        amLODIPine (NORVASC) tablet 5 mg  5 mg Oral DAILY Linus QUINTANILLA MD   5 mg at 03/11/23 1253    HYDROcodone-acetaminophen (NORCO) 5-325 mg per tablet 1 Tablet  1 Tablet Oral Q4H PRN Linus Lawton MD   1 Tablet at 03/09/23 2105    carvediloL (COREG) tablet 25 mg  25 mg Oral BID WITH MEALS Linus QUINTANILLA MD   25 mg at 03/11/23 1635    acetaminophen (TYLENOL) tablet 1,000 mg  1,000 mg Oral Q6H Ace Chavez PA-C   1,000 mg at 03/11/23 1802    *Please  patient's home medications from pharmacy at discharge*  1 Each Other PRIOR TO DISCHARGE Linus QUINTANILLA MD        enoxaparin (LOVENOX) injection 40 mg  40 mg SubCUTAneous Q24H Ace Chavez PA-C   40 mg at 03/11/23 0845       No results found for this or any previous visit (from the past 24 hour(s)). XR Results (most recent):  Results from Hospital Encounter encounter on 03/07/23    XR FEMUR RT 2 VS    Narrative  INDICATION:   postop    COMPARISON: None    FINDINGS:    2 views of the right femur demonstrate open reduction internal fixation of the  right femur with an intramedullary jazmine and dynamic hip screw, satisfactory  positioning for intertrochanteric femur fracture. No new fracture. Impression  Status post ORIF right intertrochanteric femur fracture as above.        ASSESSMENT:   1. Closed nondisplaced intertrochanteric fracture of right femur, initial encounter ([facility])  -     HYDROcodone-acetaminophen (NORCO) 5-325 mg per tablet; Take 1 Tablet by mouth every six (6) hours as needed for Pain for up to 3 days. Max Daily Amount: 3 Tablets. , Print, Disp-12 Tablet, R-0  2. Nondisp intertroch fx right femur, init for opn fx type I/2 ([address]  -     CALL MD  -     ACTIVITY AFTER DISCHARGE  -     DRESSING, CHANGE SPECIFY     Status post long gamma nail to the right femur. Gina Brown PLAN:     Partial weightbearing right lower limb with a walker and help  DVT prophylaxis per the attending. Follow-up 2 weeks in the orthopedic clinic. Keep the wound covered at all times. Do not wet the wound. No follow-ups on file.               2 weeks in the respiratory clinic

## 2023-03-12 NOTE — PROGRESS NOTES
PROGRESS NOTE      Subjective: Uneventful night. No chest pain or shortness of breath. Right hip pain tolerable. Food is good. No constipation. No fever or chills. Visit Vitals  BP (!) 160/89 (BP 1 Location: Right upper arm, BP Patient Position: Lying right side)   Pulse 60   Temp 98.2 °F (36.8 °C)   Resp 16   Ht 5' 11\" (1.803 m)   Wt 61.2 kg (135 lb)   SpO2 97%   BMI 18.83 kg/m²       Current Facility-Administered Medications:     celecoxib (CELEBREX) capsule 200 mg, 200 mg, Oral, DAILY, Kingsley Paredes MD, 200 mg at 03/12/23 0801    amLODIPine (NORVASC) tablet 5 mg, 5 mg, Oral, DAILY, Kingsley Paredes MD, 5 mg at 03/12/23 0801    HYDROcodone-acetaminophen (NORCO) 5-325 mg per tablet 1 Tablet, 1 Tablet, Oral, Q4H PRN, Jeff QUINTANILLA MD, 1 Tablet at 03/09/23 2105    carvediloL (COREG) tablet 25 mg, 25 mg, Oral, BID WITH MEALS, Kingsley Paredes MD, 25 mg at 03/12/23 0753    acetaminophen (TYLENOL) tablet 1,000 mg, 1,000 mg, Oral, Q6H, Ace Chavez PA-C, 1,000 mg at 03/12/23 0601    *Please  patient's home medications from pharmacy at discharge*, 1 Each, Other, PRIOR TO DISCHARGE, Kingsley Paredes MD    enoxaparin (LOVENOX) injection 40 mg, 40 mg, SubCUTAneous, Q24H, Ace Chavez PA-C, 40 mg at 03/12/23 0801  No results found for this or any previous visit (from the past 24 hour(s)). DUPLEX LOWER EXT VENOUS BILAT   Final Result      XR FEMUR RT 2 VS   Final Result   Status post ORIF right intertrochanteric femur fracture as above. XR FLUOROSCOPY UNDER 60 MINUTES   Final Result   See above      CT HIP RT WO CONT   Final Result   1. Comminuted intertrochanteric right hip fracture   2. Severe degeneration of the right hip with osteopenia   3. Massive hydroceles                    HEENT: Normocephalic atraumatic anicteric sclera no teeth no thrush. Neck: No distended neck vein.   Lungs: Diminished breath sounds no coarse crackles or wheeze  Heart: Regular  Abdomen: Soft nontender nondistended positive bowel sounds  Lower Extremities: Dressing clean dry and intact on the right. No edema  CNS: Alert and oriented follows command moves extremities  Psych: Cooperative     Assessment: #1.  S/p  ORIF following fracture  right hip fracture  #2. Status post fall traumatic  #3. Hypertension  #4. Underlying COPD asymptomatic  #5. Bilateral large hydronephrosis asymptomatic          Plan: Control blood pressure. Apparently patient has been accepted but there is no bed at [facility] for rehab. Continue to follow discharge when bed is available.

## 2023-03-12 NOTE — PROGRESS NOTES
PHYSICAL THERAPY TREATMENT  Patient: Matilda Babb [de-identified]81 y.o. male)  Date: 3/12/2023  Diagnosis: Intertrochanteric fracture ([address] Shmuel Albin <principal problem not specified>  Procedure(s) (LRB):  FEMUR GAMMA NAIL INSERTION ON RIGHT (Right) 5 Days Post-Op  Precautions: In place during session:   Chart, physical therapy assessment, plan of care and goals were reviewed. ASSESSMENT  Patient continues with skilled PT services and is progressing towards goals. Pt supine upon PT arrival, agreeable to session. (See below for objective details and assist levels). Overall pt tolerated session well today with PT. Pt motivated for participation with PT. Decreased assistance required for bed mobility and transfers. Amb slow steady bing with good adherence to WB precautions with use of RW. Additional time required for mobility. Pt with c/o increased soreness today from previous tx. Will continue to benefit from skilled PT services, and will continue to progress as tolerated. Current Level of Function Impacting Discharge (mobility/balance): PWB RLE. PLAN :  Patient continues to benefit from skilled intervention to address the above impairments. Continue treatment per established plan of care to address goals. Recommend with staff: Out of bed to chair for meals, Encourage HEP in prep for ADLs/mobility, Amb to bathroom for toileting with gt belt and AD, and Amb in hallway    Recommendation for discharge: (in order for the patient to meet his/her long term goals)  Inpatient Rehabilitation Facility            SUBJECTIVE:   Patient stated I think I may have done too much yesterday with all that walking. It's hurting more today.     OBJECTIVE DATA SUMMARY:   Critical Behavior:  Neurologic State: Alert  Orientation Level: Oriented X4  Cognition: Appropriate decision making  Safety/Judgement: Good awareness of safety precautions  Functional Mobility Training:  Bed Mobility:     Supine to Sit: Independent  Sit to Supine: Independent  Scooting: Independent        Transfers:  Sit to Stand: Stand-by assistance  Stand to Sit: Stand-by assistance                             Balance:  Sitting: Intact  Standing: Intact; With support  Standing - Static: Good  Standing - Dynamic : Good  Ambulation/Gait Training:  Distance (ft): 100 Feet (ft)  Assistive Device: Gait belt;Walker, rolling  Ambulation - Level of Assistance: Stand-by assistance           Right Side Weight Bearing: Partial (%)                                   Stairs:              Pain Rating:  3/10    Activity Tolerance:   Good    After treatment patient left in no apparent distress:   Bed locked and returned to lowest position, Supine in bed and Call bell within reach    GOALS:    Problem: Mobility Impaired (Adult and Pediatric)  Goal: *Acute Goals and Plan of Care (Insert Text)  Description: I with LE HEP x7 days  SBA with bed mob x7 days  SBA with all transfers PWB on R LE x 7 days  Amb 50-75ft with RW and PWB on R LE x 7 days    Pt stated goal: to get better  Outcome: Progressing Towards Goal             Rosemary Rivera PT   Time Calculation: 25 mins

## 2023-03-12 NOTE — PROGRESS NOTES
Bedside, Verbal, and Written shift change report given to Sergey Jeong   (oncoming nurse) by Zuri Lobo   (offgoing nurse). Report included the following information SBAR, Intake/Output, MAR, Recent Results, and Quality Measures.

## 2023-03-13 VITALS
WEIGHT: 135 LBS | SYSTOLIC BLOOD PRESSURE: 140 MMHG | TEMPERATURE: 96.8 F | HEART RATE: 57 BPM | DIASTOLIC BLOOD PRESSURE: 79 MMHG | RESPIRATION RATE: 16 BRPM | OXYGEN SATURATION: 98 % | HEIGHT: 71 IN | BODY MASS INDEX: 18.9 KG/M2

## 2023-03-13 PROCEDURE — 74011250636 HC RX REV CODE- 250/636: Performed by: PHYSICIAN ASSISTANT

## 2023-03-13 PROCEDURE — 74011250637 HC RX REV CODE- 250/637: Performed by: PHYSICIAN ASSISTANT

## 2023-03-13 PROCEDURE — 74011250637 HC RX REV CODE- 250/637: Performed by: INTERNAL MEDICINE

## 2023-03-13 PROCEDURE — 97530 THERAPEUTIC ACTIVITIES: CPT

## 2023-03-13 RX ORDER — AMLODIPINE BESYLATE 10 MG/1
10 TABLET ORAL DAILY
Qty: 30 TABLET | Refills: 0 | Status: SHIPPED | OUTPATIENT
Start: 2023-03-14 | End: 2023-04-13

## 2023-03-13 RX ORDER — AMLODIPINE BESYLATE 5 MG/1
10 TABLET ORAL DAILY
Status: DISCONTINUED | OUTPATIENT
Start: 2023-03-14 | End: 2023-03-13 | Stop reason: HOSPADM

## 2023-03-13 RX ADMIN — AMLODIPINE BESYLATE 5 MG: 5 TABLET ORAL at 09:37

## 2023-03-13 RX ADMIN — CELECOXIB 200 MG: 200 CAPSULE ORAL at 09:37

## 2023-03-13 RX ADMIN — ACETAMINOPHEN 1000 MG: 325 TABLET ORAL at 12:41

## 2023-03-13 RX ADMIN — ACETAMINOPHEN 1000 MG: 325 TABLET ORAL at 06:33

## 2023-03-13 RX ADMIN — CARVEDILOL 25 MG: 12.5 TABLET, FILM COATED ORAL at 09:42

## 2023-03-13 RX ADMIN — ENOXAPARIN SODIUM 40 MG: 100 INJECTION SUBCUTANEOUS at 09:37

## 2023-03-13 NOTE — PROGRESS NOTES
PROGRESS NOTE      Subjective: Patient was pressed to go to the bathroom yesterday so he got up by himself and he fell without any loss of consciousness bruised his right elbow. He got himself back up. No head trauma. He denies any worsening pain in his hip. Visit Vitals  BP (!) 164/89   Pulse 74   Temp 97.7 °F (36.5 °C)   Resp 18   Ht 5' 11\" (1.803 m)   Wt 61.2 kg (135 lb)   SpO2 98%   BMI 18.83 kg/m²       Current Facility-Administered Medications:     [START ON 3/14/2023] amLODIPine (NORVASC) tablet 10 mg, 10 mg, Oral, DAILY, Kingsley Chamberlain MD    celecoxib (CELEBREX) capsule 200 mg, 200 mg, Oral, DAILY, Kingsley Pearson MD, 200 mg at 03/13/23 5127    HYDROcodone-acetaminophen (NORCO) 5-325 mg per tablet 1 Tablet, 1 Tablet, Oral, Q4H PRN, Alfonso QUINTANILLA MD, 1 Tablet at 03/09/23 2105    carvediloL (COREG) tablet 25 mg, 25 mg, Oral, BID WITH MEALS, Kingsley Pearson MD, 25 mg at 03/13/23 1298    acetaminophen (TYLENOL) tablet 1,000 mg, 1,000 mg, Oral, Q6H, Ace Chavez PA-C, 1,000 mg at 03/13/23 9766    *Please  patient's home medications from pharmacy at discharge*, 1 Each, Other, PRIOR TO DISCHARGE, Kingsley Pearson MD    enoxaparin (LOVENOX) injection 40 mg, 40 mg, SubCUTAneous, Q24H, Ace Chavez PA-C, 40 mg at 03/13/23 0937  No results found for this or any previous visit (from the past 24 hour(s)). DUPLEX LOWER EXT VENOUS BILAT   Final Result      XR FEMUR RT 2 VS   Final Result   Status post ORIF right intertrochanteric femur fracture as above. XR FLUOROSCOPY UNDER 60 MINUTES   Final Result   See above      CT HIP RT WO CONT   Final Result   1. Comminuted intertrochanteric right hip fracture   2. Severe degeneration of the right hip with osteopenia   3.  Massive hydroceles                    HEENT: Normocephalic atraumatic anicteric sclera no teeth  Neck: No distended neck vein  Lungs: Diminished breath sounds no coarse crackles or wheeze  Heart: Regular  Abdomen: Soft positive bowel sounds nontender nondistended  Lower Extremities: No edema. Right elbow no apparent ecchymosis or hematoma. Right hip dressing intact. No new bruise  CNS: Alert and oriented follows command moves extremities. Psych: Cooperative     Assessment:#1. Status post fall no apparent major injury in the hospital  #2. Status post traumatic fall causing right hip fracture  #3. Status post ORIF of the right hip  #4. Hypertension  #5. Underlying asymptomatic COPD  #6. Bilateral large hydronephrosis asymptomatic              Plan: Discussed with patient at length not to get up by himself if she uses call button. He verbalized understanding. Adjust blood pressure medication. Discussed with case management regarding inpatient rehab. Which she will have a bed today. Patient is stable to go.

## 2023-03-13 NOTE — ROUTINE PROCESS
Patient went up to the bathroom without calling, bed alarm went off. Partient seen by Selene Carmen RN seated on the toilet seat, blood running on his right arm. As per patient, he fell while he was on his way to the bathroom. Assisted patien tback safely from toilet to bed. Dual skin assessment done. A cut on the right elbow noted, dressing placed. No complaints of pain. Vital signs taken and recorded.

## 2023-03-13 NOTE — PROGRESS NOTES
Patient has bed available today at [facility] and rehab at 2pm/1400. Patient is aware and will not qualify for transport through insurance due to walking 100 feet. Patient calling daughter to see if she ca transport. Discharge plan of care/case management plan validated with provider discharge order.     Nurse to call report at [phone]

## 2023-03-13 NOTE — PROGRESS NOTES
OCCUPATIONAL THERAPY TREATMENT  Patient: Rg Tran (71 y.o. male)  Date: 3/13/2023  Diagnosis: Intertrochanteric fracture ([facility] [address] Michael Flynn <principal problem not specified>  Procedure(s) (LRB):  FEMUR GAMMA NAIL INSERTION ON RIGHT (Right) 6 Days Post-Op  Precautions: In place during session: None  Chart, occupational therapy assessment, plan of care, and goals were reviewed. ASSESSMENT  Pt continues with skilled OT services and is progressing towards goals. Upon OLIVEIRA arrival, pt semi supine in bed and agreeable to tx session at this time. Overall, pt continues to present with deficits in generalized strength/AROM, coordination, bed mobility, static/dynamic sitting and standing balance and functional activity tolerance during performance of ADLs/mobility (see below for objective details and assist levels). Pt tolerated session fair this date with completion of bed mobility, transfers, and ambulation. Pt completed bed mobility with SBA and transfers with CGA. Pt completed ambulation in room with CGA using RW in preparation for household mobility. Pt completed transfer to recliner with CGA and cueing to proper transfer technique. Pt reporting falling last night and pt educated on safety concerns for pt completing mobility without assist at this time. Pt required repetition of instruction to not get out of chair without assist due to fall risk and pt verbalized understanding. Pt declined completion of ADLs, therex, or further mobility at this time. Pt left seated in recliner with call bell within reach and needs met. Recommend d/c to [address]  once medically appropriate. Other factors to consider for discharge: family/social support, DME, time since onset, severity of deficits, decline from functional baseline         PLAN :  Patient continues to benefit from skilled intervention to address the above impairments.   Continue treatment per established plan of care to address goals. Recommendation for discharge: (in order for the patient to meet his/her long term goals)  [address]     This discharge recommendation:  Has been made in collaboration with the attending provider and/or case management    IF patient discharges home will need the following DME: TBD       SUBJECTIVE:   Patient stated I fell last night trying to go to the bathroom.     OBJECTIVE DATA SUMMARY:   Cognitive/Behavioral Status:  Neurologic State: Alert  Orientation Level: Oriented X4  Cognition: Follows commands    Functional Mobility and Transfers for ADLs:  Bed Mobility:  Rolling: Stand-by assistance  Supine to Sit: Stand-by assistance  Scooting: Stand-by assistance    Transfers:  Sit to Stand: Contact guard assistance     Bed to Chair: Contact guard assistance    Balance:  Sitting: Intact; Without support  Sitting - Static: Good (unsupported)  Sitting - Dynamic: Good (unsupported)  Standing: Impaired; With support  Standing - Static: Constant support;Good  Standing - Dynamic : Constant support; Fair    Pain:  0/10    Activity Tolerance:   Fair and requires rest breaks  Please refer to the flowsheet for vital signs taken during this treatment. After treatment patient left in no apparent distress:   Bed locked and in lowest position  Sitting in chair and Call bell within reach    COMMUNICATION/COLLABORATION:   The patients plan of care was discussed with: Registered nurse and Case management. Saint Bill, COTA  Time Calculation: 12 mins    Problem: Self Care Deficits Care Plan (Adult)  Goal: *Acute Goals and Plan of Care (Insert Text)  Description: FUNCTIONAL STATUS PRIOR TO ADMISSION: Pt was Ind with no AD PTA. HOME SUPPORT: Pt lives with son.     Occupational Therapy Goals  Initiated 3/8/2023    Pt stated goal: To return to PLOF  Pt will be Mod I sup <> sit in prep for EOB ADLs  Pt will be Mod I grooming standing sink side LRAD  Pt will be Mod I UB dressing sitting EOB/long sit   Pt will be Mod I LE dressing sitting EOB/long sit  Pt will be Mod I sit <>  prep for toileting LRAD  Pt will be Mod I toileting/toilet transfer/cloth mgmt LRAD    Outcome: Progressing Towards Goal

## 2023-03-13 NOTE — PROGRESS NOTES
Problem: Pain  Goal: *Control of Pain  Outcome: Progressing Towards Goal     Problem: Nutrition Deficit  Goal: *Optimize nutritional status  Outcome: Progressing Towards Goal     Problem: Falls - Risk of  Goal: *Absence of Falls  Description: Document Antonio Fall Risk and appropriate interventions in the flowsheet.   Outcome: Progressing Towards Goal  Note: Fall Risk Interventions:            Medication Interventions: Patient to call before getting OOB    Elimination Interventions: Patient to call for help with toileting needs, Call light in reach              Problem: Patient Education: Go to Patient Education Activity  Goal: Patient/Family Education  Outcome: Progressing Towards Goal     Problem: Infection - Risk of, Surgical Site Infection  Goal: *Absence of surgical site infection signs and symptoms  Outcome: Progressing Towards Goal     Problem: Patient Education: Go to Patient Education Activity  Goal: Patient/Family Education  Outcome: Progressing Towards Goal     Problem: Patient Education: Go to Patient Education Activity  Goal: Patient/Family Education  Outcome: Progressing Towards Goal     Problem: Patient Education: Go to Patient Education Activity  Goal: Patient/Family Education  Outcome: Progressing Towards Goal     Problem: Discharge Planning  Goal: *Discharge to safe environment  Outcome: Progressing Towards Goal  Goal: *Knowledge of medication management  Outcome: Progressing Towards Goal  Goal: *Knowledge of discharge instructions  Outcome: Progressing Towards Goal     Problem: Patient Education: Go to Patient Education Activity  Goal: Patient/Family Education  Outcome: Progressing Towards Goal

## 2023-03-15 ENCOUNTER — HOSPITAL ENCOUNTER (OUTPATIENT)
Dept: LAB | Age: 73
Discharge: HOME | End: 2023-03-15

## 2023-03-15 LAB
CHOLEST SERPL-MCNC: 153 MG/DL
HDLC SERPL-MCNC: 47 MG/DL
HDLC SERPL: 3.3 (ref 0–5)
LDLC SERPL CALC-MCNC: 89.6 MG/DL (ref 0–100)
LIPID PROFILE,FLP: NORMAL
TRIGL SERPL-MCNC: 82 MG/DL (ref ?–150)
VLDLC SERPL CALC-MCNC: 16.4 MG/DL

## 2023-03-15 PROCEDURE — 80061 LIPID PANEL: CPT

## 2023-03-28 LAB
EKG ATRIAL RATE: 68 BPM
EKG DIAGNOSIS: NORMAL
EKG P AXIS: 65 DEGREES
EKG P-R INTERVAL: 157 MS
EKG Q-T INTERVAL: 433 MS
EKG QRS DURATION: 157 MS
EKG QTC CALCULATION (BAZETT): 457 MS
EKG R AXIS: -48 DEGREES
EKG T AXIS: 98 DEGREES
EKG VENTRICULAR RATE: 67 BPM

## 2023-03-31 ENCOUNTER — OFFICE VISIT (OUTPATIENT)
Dept: SURGERY | Age: 73
End: 2023-03-31
Payer: COMMERCIAL

## 2023-03-31 VITALS
SYSTOLIC BLOOD PRESSURE: 151 MMHG | HEIGHT: 71 IN | OXYGEN SATURATION: 98 % | TEMPERATURE: 97.6 F | RESPIRATION RATE: 17 BRPM | DIASTOLIC BLOOD PRESSURE: 96 MMHG | HEART RATE: 72 BPM | BODY MASS INDEX: 18.9 KG/M2 | WEIGHT: 135 LBS

## 2023-03-31 DIAGNOSIS — Z09 POSTOPERATIVE EXAMINATION: Primary | ICD-10-CM

## 2023-03-31 PROCEDURE — 99024 POSTOP FOLLOW-UP VISIT: CPT | Performed by: ORTHOPAEDIC SURGERY

## 2023-03-31 NOTE — PROGRESS NOTES
Identified pt with two pt identifiers (name and ). Reviewed chart in preparation for visit and have obtained necessary documentation. Dipika Brar is a 81 y.o. male  Chief Complaint   Patient presents with    Post OP Follow Up     2 week post op nondisplaced intertrochanteric fracture of right femur     Visit Vitals  BP (!) 151/96 (BP 1 Location: Left upper arm, BP Patient Position: Sitting)   Pulse 72   Temp 97.6 °F (36.4 °C) (Temporal)   Resp 17   Ht 5' 11\" (1.803 m)   Wt 135 lb (61.2 kg)   SpO2 98%   BMI 18.83 kg/m²       1. Have you been to the ER, urgent care clinic since your last visit? Hospitalized since your last visit? No    2. Have you seen or consulted any other health care providers outside of the [address] [place] [address] since your last visit? Include any pap smears or colon screening. No    Patient and provider made aware of elevated BP x2. Patient asymptomatic. Patient reminded to monitor BP, continue to take BP medications if prescribed, and follow up with PCP/Cardiologist.  Patient expressed understanding and agreement.

## 2023-03-31 NOTE — PROGRESS NOTES
Postop Progress Note    Subjective    Reji Fallon presents to the office 3 weeks status post right cephalomedullary nail for a intertrochanteric fracture. This was performed by Yeni Estrada on 7 March 2023. He did not get x-rays today. He states pain is very well controlled. He is working with physical therapy. He has no calf pain. He has no shortness of breath fever or chills. He is on Eliquis. Objective    Visit Vitals  BP (!) 151/96 (BP 1 Location: Left upper arm, BP Patient Position: Sitting)   Pulse 72   Temp 97.6 °F (36.4 °C) (Temporal)   Resp 17   Ht 5' 11\" (1.803 m)   Wt 135 lb (61.2 kg)   SpO2 98%   BMI 18.83 kg/m²     General: alert, cooperative, no distress, appears stated age  Incision: healing well. NVID right lower extremity. Assessment    79-year-old male 3 weeks status post right cephalomedullary nail for a right peritrochanteric fracture. He is doing well. Plan   1. Continue any current medications  2. Wound care discussed  3. Wound/Incision: sutures removed and staples removed  4. Disposition:   Pt is to increase activities as tolerated. 5. Diet: Regular Diet  6. Follow up: 4 weeks. He is to obtain right femur films before this visit. This was reemphasized multiple times.          Electronically signed by Umesh Thompson MD on 3/31/2023 at 2:46 PM

## 2023-04-03 ENCOUNTER — OFFICE VISIT (OUTPATIENT)
Dept: SURGERY | Age: 73
End: 2023-04-03
Payer: COMMERCIAL

## 2023-04-03 PROCEDURE — 99203 OFFICE O/P NEW LOW 30 MIN: CPT | Performed by: SURGERY

## 2023-04-03 PROCEDURE — 1123F ACP DISCUSS/DSCN MKR DOCD: CPT | Performed by: SURGERY

## 2023-04-03 NOTE — PROGRESS NOTES
Identified pt with two pt identifiers (name and ). Reviewed chart in preparation for visit and have obtained necessary documentation. Matilda Babb is a 81 y.o. male  Chief Complaint   Patient presents with    New Patient     Arterial occlusion /clots     Visit Vitals  BP (!) 150/88 (BP 1 Location: Right upper arm, BP Patient Position: Sitting, BP Cuff Size: Adult)   Pulse 78   Temp 97.3 °F (36.3 °C) (Temporal)   Resp 18   Ht 5' 11\" (1.803 m)   Wt 141 lb (64 kg)   SpO2 98%   BMI 19.67 kg/m²       1. Have you been to the ER, urgent care clinic since your last visit? Hospitalized since your last visit? No    2. Have you seen or consulted any other health care providers outside of the [address] [place] [address] since your last visit? Include any pap smears or colon screening.  No

## 2023-04-06 PROBLEM — I73.9 PERIPHERAL VASCULAR DISEASE (HCC): Status: ACTIVE | Noted: 2023-04-06

## 2023-04-06 NOTE — PROGRESS NOTES
Vascular History and Physical    Patient: Luciana Choi  MRN: [id number]    YOB: 1950  Age: 81 y.o. Sex: male     Chief Complaint:  Chief Complaint   Patient presents with    New Patient     Arterial occlusion /clots       History of Present Illness: Luciana Choi is a 81 y.o. pleasant gentleman is here today for vascular assessment. Patient recently underwent venous ultrasound which showed thrombosed on the femoral vein. At the same time bilateral SFA occlusion noted. Patient does have a bilateral leg pain. Pain is worsened with ambulation and activity. Patient currently taking Eliquis. Social History:  Social Connections: Not on file       Past Medical History:  Past Medical History:   Diagnosis Date    Hypertension        Surgical History:  History reviewed. No pertinent surgical history. Allergies:  No Known Allergies    Current Meds:  Current Outpatient Medications   Medication Sig Dispense Refill    apixaban (ELIQUIS) 5 mg tablet Take 5 mg by mouth two (2) times a day. amLODIPine (NORVASC) 10 mg tablet Take 1 Tablet by mouth daily for 30 days. 30 Tablet 0    carvediloL (COREG) 25 mg tablet Take 25 mg by mouth two (2) times daily (with meals). celecoxib (CELEBREX) 200 mg capsule Take 1 Capsule by mouth daily for 90 days. (Patient not taking: No sig reported) 30 Capsule 2       Review of Systems:  I reviewed the rest of organ systems personally and they are negative. Pertinent findings discussed in HPI.     Physical Examination    Visit Vitals  BP (!) 150/88 (BP 1 Location: Right upper arm, BP Patient Position: Sitting, BP Cuff Size: Adult)   Pulse 78   Temp 97.3 °F (36.3 °C) (Temporal)   Resp 18   Ht 5' 11\" (1.803 m)   Wt 141 lb (64 kg)   SpO2 98%   BMI 19.67 kg/m²     Gen: Well developed, well nourished 81 y.o. male in no acute distress  Head: normocephalic, atraumatic  Mouth: Clear, no overt lesions, oral mucosa pink and moist  Neck: supple, no masses, no adenopathy or carotid bruits, trachea midline  Resp: clear to auscultation bilaterally, no wheeze, rhonchi or rales, excursions normal and symmetrical  Cardio: Regular rate and rhythm, no murmurs, clicks, gallops or rubs, no edema or varicosities  Abdomen: soft, nontender, nondistended, normoactive bowel sounds, no hernias, no hepatosplenomegaly   Neuro: sensation and strength grossly intact and symmetrical  Psych: alert and oriented to person, place and time  Vascular examination: Vascular examination shows 3+ monophasic right DP and PT 2+ monophasic left DP and 1+ monophasic left PT. Skin: warm and moist.    Labs:  Hospital Outpatient Visit on 03/15/2023   Component Date Value Ref Range Status    LIPID PROFILE 03/15/2023      Final    Cholesterol, total 03/15/2023 153  <200 mg/dL Final    Triglyceride 03/15/2023 82  <150 mg/dL Final    Comment: Based on NCEP-ATP III:  Triglycerides <150 mg/dL  is considered  normal, 150-199 mg/dL  borderline high,  200-499 mg/dL high and   greater than or equal to 500 mg/dL very high. HDL Cholesterol 03/15/2023 47  mg/dL Final    Comment: Based on NCEP ATP III, HDL Cholesterol <40 mg/dL is considered low  and >60 mg/dL is elevated.       LDL, calculated 03/15/2023 89.6  0 - 100 mg/dL Final    Comment: Based on the NCEP-ATP: LDL-C concentrations are considered  optimal  <100 mg/dL,  near optimal/above Normal 100-129 mg/dL  Borderline High: 130-159, High: 160-189 mg/dL  Very High: Greater than or equal to 190 mg/dL      VLDL, calculated 03/15/2023 16.4  mg/dL Final    CHOL/HDL Ratio 03/15/2023 3.3  0.0 - 5.0   Final   Admission on 03/07/2023, Discharged on 03/13/2023   Component Date Value Ref Range Status    WBC 03/07/2023 11.1  4.1 - 11.1 K/uL Final    RBC 03/07/2023 3.67 (L)  4.10 - 5.70 M/uL Final    HGB 03/07/2023 10.0 (L)  12.1 - 17.0 g/dL Final    HCT 03/07/2023 32.1 (L)  36.6 - 50.3 % Final    MCV 03/07/2023 87.5  80.0 - 99.0 FL Final    MCH 03/07/2023 27.2  26.0 - 34.0 PG Final    MCHC 03/07/2023 31.2  30.0 - 36.5 g/dL Final    RDW 03/07/2023 17.1 (H)  11.5 - 14.5 % Final    PLATELET 85/18/8747 875  150 - 400 K/uL Final    MPV 03/07/2023 9.9  8.9 - 12.9 FL Final    NRBC 03/07/2023 0.0  0.0  WBC Final    ABSOLUTE NRBC 03/07/2023 0.00  0.00 - 0.01 K/uL Final    NEUTROPHILS 03/07/2023 65  32 - 75 % Final    LYMPHOCYTES 03/07/2023 24  12 - 49 % Final    MONOCYTES 03/07/2023 8  5 - 13 % Final    EOSINOPHILS 03/07/2023 2  0 - 7 % Final    BASOPHILS 03/07/2023 1  0 - 1 % Final    IMMATURE GRANULOCYTES 03/07/2023 0  0 - 0.5 % Final    ABS. NEUTROPHILS 03/07/2023 7.2  1.8 - 8.0 K/UL Final    ABS. LYMPHOCYTES 03/07/2023 2.7  0.8 - 3.5 K/UL Final    ABS. MONOCYTES 03/07/2023 0.9  0.0 - 1.0 K/UL Final    ABS. EOSINOPHILS 03/07/2023 0.3  0.0 - 0.4 K/UL Final    ABS. BASOPHILS 03/07/2023 0.1  0.0 - 0.1 K/UL Final    ABS. IMM. GRANS. 03/07/2023 0.0  0.00 - 0.04 K/UL Final    DF 03/07/2023 AUTOMATED    Final    Crossmatch Expiration 03/07/2023 03/10/2023,2359   Final    ABO/Rh(D) 03/07/2023 O Positive   Final    Antibody screen 03/07/2023 Negative   Final    Prothrombin time 03/07/2023 13.9  11.9 - 14.6 sec Final    INR 03/07/2023 1.1  0.9 - 1.1   Final    Sodium 03/07/2023 138  136 - 145 mmol/L Final    Potassium 03/07/2023 3.5  3.5 - 5.1 mmol/L Final    Chloride 03/07/2023 109 (H)  97 - 108 mmol/L Final    CO2 03/07/2023 26  21 - 32 mmol/L Final    Anion gap 03/07/2023 3 (L)  5 - 15 mmol/L Final    Glucose 03/07/2023 103 (H)  65 - 100 mg/dL Final    BUN 03/07/2023 18  6 - 20 mg/dL Final    Creatinine 03/07/2023 1.16  0.70 - 1.30 mg/dL Final    BUN/Creatinine ratio 03/07/2023 16  12 - 20   Final    eGFR 03/07/2023 >60  >60 ml/min/1.73m2 Final    Comment:    Pediatric calculator link: Rosario.at. org/professionals/kdoqi/gfr_calculatorped    These results are not intended for use in patients <25years of age. eGFR results are calculated without a race factor using  the 2021 CKD-EPI equation. Careful clinical correlation is recommended, particularly when comparing to results calculated using previous equations. The CKD-EPI equation is less accurate in patients with extremes of muscle mass, extra-renal metabolism of creatinine, excessive creatine ingestion, or following therapy that affects renal tubular secretion. Calcium 03/07/2023 8.7  8.5 - 10.1 mg/dL Final    Bilirubin, total 03/07/2023 0.3  0.2 - 1.0 mg/dL Final    AST (SGOT) 03/07/2023 9 (L)  15 - 37 U/L Final    ALT (SGPT) 03/07/2023 9 (L)  12 - 78 U/L Final    Alk. phosphatase 03/07/2023 75  45 - 117 U/L Final    Protein, total 03/07/2023 6.2 (L)  6.4 - 8.2 g/dL Final    Albumin 03/07/2023 2.3 (L)  3.5 - 5.0 g/dL Final    Globulin 03/07/2023 3.9  2.0 - 4.0 g/dL Final    A-G Ratio 03/07/2023 0.6 (L)  1.1 - 2.2   Final         Images:      Bradford Moore MD    Assessments:  Patient Active Problem List   Diagnosis Code    Intertrochanteric fracture [facility]) S72.143A    Peripheral vascular disease ([facility] I73.9       Plans: Patient was discussed these findings in details. Patient's symptoms appears to be not too bad. Patient does have leg pain. Patient has a bilateral SFA occlusion on ultrasound. I did recommend arterial physiologic testing with ONEYDA with PVR examination. Patient was discussed about risk factor for PAD. Patient was advised daily activities and exercise to keep hydration optimal.  Patient does have cholesterol rechecked. Patient will be reassessed in 3-month with ONEYDA examination.           Bradford Moore MD

## 2023-04-17 ENCOUNTER — TRANSCRIBE ORDERS (OUTPATIENT)
Facility: HOSPITAL | Age: 73
End: 2023-04-17

## 2023-04-17 ENCOUNTER — TRANSCRIBE ORDER (OUTPATIENT)
Dept: SCHEDULING | Age: 73
End: 2023-04-17

## 2023-04-17 DIAGNOSIS — F17.210 CIGARETTE SMOKER: Primary | ICD-10-CM

## 2023-04-19 ENCOUNTER — TELEPHONE (OUTPATIENT)
Dept: SURGERY | Age: 73
End: 2023-04-19

## 2023-04-21 RX ORDER — AMLODIPINE BESYLATE 10 MG/1
10 TABLET ORAL DAILY
Qty: 30 TABLET | Refills: 1 | Status: SHIPPED | OUTPATIENT
Start: 2023-04-21 | End: 2023-04-21 | Stop reason: CLARIF

## 2023-04-21 RX ORDER — AMLODIPINE BESYLATE 10 MG/1
10 TABLET ORAL DAILY
Qty: 30 TABLET | Refills: 1 | Status: SHIPPED | OUTPATIENT
Start: 2023-04-21 | End: 2023-04-23

## 2023-04-21 NOTE — TELEPHONE ENCOUNTER
I spoke with Jamarcus Ascencio he said to refill the patients eliquis and amlodipine but the patient needs to follow up with his PCP regarding his Hypertension. Tried to call the patients daughter but her voicemail box is not set up.

## 2023-04-21 NOTE — TELEPHONE ENCOUNTER
Accidentally sent prescriptions under the wrong doctors name. I called [facility] and cancelled the prescription and said I would send new prescriptions under the correct doctors name.

## 2023-04-23 RX ORDER — AMLODIPINE BESYLATE 10 MG/1
10 TABLET ORAL DAILY
Qty: 90 TABLET | Refills: 2 | Status: SHIPPED | OUTPATIENT
Start: 2023-04-23

## 2023-04-26 DIAGNOSIS — Z09 POSTOPERATIVE EXAMINATION: Primary | ICD-10-CM

## 2023-05-02 DIAGNOSIS — Z98.890 POSTOPERATIVE STATE: Primary | ICD-10-CM

## 2023-05-02 DIAGNOSIS — I73.9 PERIPHERAL VASCULAR DISEASE (HCC): Primary | ICD-10-CM

## 2023-05-09 ENCOUNTER — HOSPITAL ENCOUNTER (OUTPATIENT)
Facility: HOSPITAL | Age: 73
Discharge: HOME | End: 2023-05-12
Payer: COMMERCIAL

## 2023-05-09 VITALS — BODY MASS INDEX: 20.32 KG/M2 | WEIGHT: 150 LBS | HEIGHT: 72 IN

## 2023-05-09 DIAGNOSIS — F17.210 CIGARETTE SMOKER: ICD-10-CM

## 2023-05-09 DIAGNOSIS — Z98.890 POSTOPERATIVE STATE: ICD-10-CM

## 2023-05-09 PROCEDURE — 71271 CT THORAX LUNG CANCER SCR C-: CPT

## 2023-05-09 PROCEDURE — 73552 X-RAY EXAM OF FEMUR 2/>: CPT

## 2023-05-11 DIAGNOSIS — S72.144D CLOSED NONDISPLACED INTERTROCHANTERIC FRACTURE OF RIGHT FEMUR WITH ROUTINE HEALING, SUBSEQUENT ENCOUNTER: Primary | ICD-10-CM

## 2023-05-12 ENCOUNTER — OFFICE VISIT (OUTPATIENT)
Age: 73
End: 2023-05-12

## 2023-05-12 VITALS
HEIGHT: 72 IN | DIASTOLIC BLOOD PRESSURE: 90 MMHG | TEMPERATURE: 97.5 F | WEIGHT: 147 LBS | RESPIRATION RATE: 16 BRPM | OXYGEN SATURATION: 95 % | SYSTOLIC BLOOD PRESSURE: 148 MMHG | BODY MASS INDEX: 19.91 KG/M2 | HEART RATE: 79 BPM

## 2023-05-12 DIAGNOSIS — S72.141A CLOSED DISPLACED INTERTROCHANTERIC FRACTURE OF RIGHT FEMUR, INITIAL ENCOUNTER (HCC): Primary | ICD-10-CM

## 2023-05-12 PROCEDURE — 99024 POSTOP FOLLOW-UP VISIT: CPT | Performed by: ORTHOPAEDIC SURGERY

## 2023-05-12 RX ORDER — APIXABAN 5 MG/1
5 TABLET, FILM COATED ORAL 2 TIMES DAILY
COMMUNITY
Start: 2023-04-21 | End: 2023-06-29 | Stop reason: SDUPTHER

## 2023-05-12 RX ORDER — CELECOXIB 200 MG/1
200 CAPSULE ORAL DAILY
Qty: 30 CAPSULE | Refills: 2 | COMMUNITY
Start: 2023-03-10 | End: 2023-06-08

## 2023-05-12 RX ORDER — HYDRALAZINE HYDROCHLORIDE 25 MG/1
TABLET, FILM COATED ORAL
COMMUNITY
Start: 2023-04-27

## 2023-05-12 RX ORDER — AMLODIPINE BESYLATE AND ATORVASTATIN CALCIUM 5; 10 MG/1; MG/1
1 TABLET, FILM COATED ORAL DAILY
COMMUNITY

## 2023-05-12 RX ORDER — AMLODIPINE BESYLATE 10 MG/1
10 TABLET ORAL DAILY
COMMUNITY
Start: 2023-04-22

## 2023-05-12 NOTE — PATIENT INSTRUCTIONS
Vero Ache for as long as you need to stay safe. This may take several weeks to months. Then use a cane on the opposite hand. Any problems at all \"emergency room.

## 2023-05-12 NOTE — PROGRESS NOTES
Assessment/Plan      Hip intertrochanteric fracture with IM nail March 7    Ambulate as tolerated follow-up as needed    HPI/History/ROS    I broke my hip in March      .      @[email or web address]    No Known Allergies     Patient Active Problem List   Diagnosis    Intertrochanteric fracture (HCC)    Peripheral vascular disease ([facility]        Review of Systems      Physical Exam       Right Hip Exam     Comments:  Range of motion adequate, patient is well healed well there is no evidence of infection has adequate range of motion      Left Hip Exam   Left hip exam is normal.                 Violetta Gallego Page, DO

## 2023-05-12 NOTE — PROGRESS NOTES
Identified pt with two pt identifiers (name and ). Reviewed chart in preparation for visit and have obtained necessary documentation. Celine Mary is a 81 y.o. male  Chief Complaint   Patient presents with    Post-Op Check     Encounter for follow-up examination after completed treatment for conditions other than malignant neoplasm     BP (!) 148/90 (Site: Right Upper Arm, Position: Sitting, Cuff Size: Large Adult)   Pulse 79   Temp 97.5 °F (36.4 °C) (Oral)   Resp 16   Ht 6' (1.829 m)   Wt 147 lb (66.7 kg)   SpO2 95%   BMI 19.94 kg/m²     1. Have you been to the ER, urgent care clinic since your last visit? Hospitalized since your last visit? No     2. Have you seen or consulted any other health care providers outside of the [address] [place] [address] since your last visit? Include any pap smears or colon screening. No    Patient and provider made aware of elevated BP x2. Patient asymptomatic. Patient reminded to monitor BP, continue to take BP medications if prescribed, and follow up with PCP/Cardiologist.  Patient expressed understanding and agreement.

## 2023-06-23 ENCOUNTER — HOSPITAL ENCOUNTER (OUTPATIENT)
Facility: HOSPITAL | Age: 73
Discharge: HOME | End: 2023-06-23
Attending: SURGERY
Payer: COMMERCIAL

## 2023-06-23 DIAGNOSIS — I73.9 PERIPHERAL VASCULAR DISEASE (HCC): ICD-10-CM

## 2023-06-23 PROCEDURE — 93923 UPR/LXTR ART STDY 3+ LVLS: CPT

## 2023-06-23 PROCEDURE — 93923 UPR/LXTR ART STDY 3+ LVLS: CPT | Performed by: SURGERY

## 2023-06-24 LAB
VAS LEFT ABI: 0.65
VAS LEFT ARM BP: 150 MMHG
VAS LEFT DORSALIS PEDIS BP: 98 MMHG
VAS LEFT PTA BP: 97 MMHG
VAS LEFT TBI: 0.59
VAS LEFT TOE PRESSURE: 89 MMHG
VAS RIGHT ABI: 0.64
VAS RIGHT ARM BP: 140 MMHG
VAS RIGHT DORSALIS PEDIS BP: 94 MMHG
VAS RIGHT PTA BP: 96 MMHG
VAS RIGHT TBI: 0.53
VAS RIGHT TOE PRESSURE: 80 MMHG

## 2023-06-28 ENCOUNTER — TELEPHONE (OUTPATIENT)
Age: 73
End: 2023-06-28

## 2023-06-29 RX ORDER — APIXABAN 5 MG/1
5 TABLET, FILM COATED ORAL 2 TIMES DAILY
Qty: 60 TABLET | Refills: 5 | Status: SHIPPED | OUTPATIENT
Start: 2023-06-29

## 2023-07-06 ENCOUNTER — OFFICE VISIT (OUTPATIENT)
Age: 73
End: 2023-07-06
Payer: COMMERCIAL

## 2023-07-06 VITALS
HEIGHT: 72 IN | SYSTOLIC BLOOD PRESSURE: 149 MMHG | BODY MASS INDEX: 21.81 KG/M2 | RESPIRATION RATE: 18 BRPM | HEART RATE: 67 BPM | DIASTOLIC BLOOD PRESSURE: 84 MMHG | WEIGHT: 161 LBS | OXYGEN SATURATION: 95 % | TEMPERATURE: 97.6 F

## 2023-07-06 DIAGNOSIS — I87.303 CHRONIC VENOUS HYPERTENSION INVOLVING BOTH SIDES: ICD-10-CM

## 2023-07-06 DIAGNOSIS — I73.9 PERIPHERAL VASCULAR DISEASE (HCC): Primary | ICD-10-CM

## 2023-07-06 PROCEDURE — 99213 OFFICE O/P EST LOW 20 MIN: CPT | Performed by: SURGERY

## 2023-07-06 PROCEDURE — 1123F ACP DISCUSS/DSCN MKR DOCD: CPT | Performed by: SURGERY

## 2023-07-06 ASSESSMENT — PATIENT HEALTH QUESTIONNAIRE - PHQ9
1. LITTLE INTEREST OR PLEASURE IN DOING THINGS: 0
SUM OF ALL RESPONSES TO PHQ QUESTIONS 1-9: 0
2. FEELING DOWN, DEPRESSED OR HOPELESS: 0
SUM OF ALL RESPONSES TO PHQ QUESTIONS 1-9: 0
SUM OF ALL RESPONSES TO PHQ9 QUESTIONS 1 & 2: 0

## 2023-07-13 PROBLEM — I87.303 CHRONIC VENOUS HYPERTENSION INVOLVING BOTH SIDES: Status: ACTIVE | Noted: 2023-07-13

## 2023-08-23 RX ORDER — APIXABAN 5 MG/1
TABLET, FILM COATED ORAL
Qty: 60 TABLET | OUTPATIENT
Start: 2023-08-23

## 2023-08-23 RX ORDER — AMLODIPINE BESYLATE 10 MG/1
10 TABLET ORAL DAILY
Qty: 30 TABLET | OUTPATIENT
Start: 2023-08-23

## 2023-10-27 ENCOUNTER — OFFICE VISIT (OUTPATIENT)
Age: 73
End: 2023-10-27
Payer: COMMERCIAL

## 2023-10-27 VITALS
OXYGEN SATURATION: 95 % | HEIGHT: 72 IN | RESPIRATION RATE: 18 BRPM | DIASTOLIC BLOOD PRESSURE: 72 MMHG | WEIGHT: 162 LBS | BODY MASS INDEX: 21.94 KG/M2 | TEMPERATURE: 98.1 F | SYSTOLIC BLOOD PRESSURE: 142 MMHG | HEART RATE: 77 BPM

## 2023-10-27 DIAGNOSIS — I73.9 PERIPHERAL VASCULAR DISEASE (HCC): Primary | ICD-10-CM

## 2023-10-27 PROCEDURE — 99213 OFFICE O/P EST LOW 20 MIN: CPT | Performed by: SURGERY

## 2023-10-27 PROCEDURE — 1123F ACP DISCUSS/DSCN MKR DOCD: CPT | Performed by: SURGERY

## 2023-10-27 ASSESSMENT — PATIENT HEALTH QUESTIONNAIRE - PHQ9
SUM OF ALL RESPONSES TO PHQ QUESTIONS 1-9: 0
2. FEELING DOWN, DEPRESSED OR HOPELESS: 0
SUM OF ALL RESPONSES TO PHQ9 QUESTIONS 1 & 2: 0
1. LITTLE INTEREST OR PLEASURE IN DOING THINGS: 0
SUM OF ALL RESPONSES TO PHQ QUESTIONS 1-9: 0

## 2024-01-10 ENCOUNTER — TELEPHONE (OUTPATIENT)
Age: 74
End: 2024-01-10

## 2024-01-10 RX ORDER — APIXABAN 5 MG/1
5 TABLET, FILM COATED ORAL 2 TIMES DAILY
Qty: 60 TABLET | Refills: 5 | Status: SHIPPED | OUTPATIENT
Start: 2024-01-10

## 2024-01-10 NOTE — TELEPHONE ENCOUNTER
Spoke with patient's daughter, Yara and informed her Dr. Bianchi has sent in the prescription for Eliquis

## 2024-01-10 NOTE — TELEPHONE ENCOUNTER
Petarjose Collinspin daughter, Yara, called in due to him needing Eliquis. She stated that he didn't have any refills but her father needs the medication. Give her a call once the prescription is sent at [phone]

## 2024-01-29 ENCOUNTER — OFFICE VISIT (OUTPATIENT)
Age: 74
End: 2024-01-29
Payer: COMMERCIAL

## 2024-01-29 VITALS
HEART RATE: 68 BPM | OXYGEN SATURATION: 96 % | HEIGHT: 72 IN | BODY MASS INDEX: 21.54 KG/M2 | TEMPERATURE: 98.1 F | WEIGHT: 159 LBS | SYSTOLIC BLOOD PRESSURE: 137 MMHG | DIASTOLIC BLOOD PRESSURE: 83 MMHG

## 2024-01-29 DIAGNOSIS — I73.9 PERIPHERAL VASCULAR DISEASE (HCC): Primary | ICD-10-CM

## 2024-01-29 DIAGNOSIS — I87.303 CHRONIC VENOUS HYPERTENSION INVOLVING BOTH SIDES: ICD-10-CM

## 2024-01-29 PROCEDURE — 99212 OFFICE O/P EST SF 10 MIN: CPT | Performed by: SURGERY

## 2024-01-29 PROCEDURE — 1123F ACP DISCUSS/DSCN MKR DOCD: CPT | Performed by: SURGERY

## 2024-01-29 NOTE — PROGRESS NOTES
Identified pt with two pt identifiers(name and ). Reviewed record in preparation for visit and have obtained necessary documentation. All patient medications has been reviewed.  Chief Complaint   Patient presents with    Follow-up       Vitals:    24 0912   BP: 137/83   Pulse: 68   Temp: 98.1 °F (36.7 °C)   SpO2: 96%                   Coordination of Care Questionnaire:   1) Have you been to an emergency room, urgent care, or hospitalized since your last visit?   no      2. Have seen or consulted any other health care provider since your last visit? no

## 2024-01-30 NOTE — PROGRESS NOTES
Vascular History and Physical    Patient: Petar Medina  MRN: [id number]    YOB: 1950  Age: 73 y.o.  Sex: male     Chief Complaint:  Chief Complaint   Patient presents with    Follow-up       History of Present Illness: Petar Medina is a 73 y.o. very pleasant gentleman is here today for follow-up vascular examination.  Patient has a history of DVT currently on Eliquis.  Patient also has moderate PAD including right ankle index 0.64, left ankle index 0.65.  Patient denies any worsening leg pain..  Patient denies any swelling.  Patient denies any chest pain shortness of breath.    Social History:  Social Connections: Not on file       Past Medical History:  Past Medical History:   Diagnosis Date    Hypertension     Hypertension        Surgical History:  No past surgical history on file.    Allergies:  No Known Allergies    Current Meds:  Current Outpatient Medications   Medication Sig Dispense Refill    ELIQUIS 5 MG TABS tablet Take 1 tablet by mouth 2 times daily 60 tablet 5    amLODIPine (NORVASC) 10 MG tablet Take 1 tablet by mouth daily      carvedilol (COREG) 25 MG tablet Take 1 tablet by mouth 2 times daily (with meals)      amLODIPine-atorvastatatin (CADUET) 5-10 MG per tablet Take 1 tablet by mouth daily (Patient not taking: Reported on 5/12/2023)      celecoxib (CELEBREX) 200 MG capsule Take 1 capsule by mouth daily 30 capsule 2    hydrALAZINE (APRESOLINE) 25 MG tablet  (Patient not taking: Reported on 7/6/2023)       No current facility-administered medications for this visit.       Review of Systems:  I reviewed the rest of organ systems personally and they are negative.  Pertinent findings discussed in HPI.    Physical Examination    /83 (Site: Left Upper Arm, Position: Sitting, Cuff Size: Medium Adult)   Pulse 68   Temp 98.1 °F (36.7 °C) (Temporal)   Ht 1.829 m (6')   Wt 72.1 kg (159 lb)   SpO2 96%   BMI 21.56 kg/m²   Gen: Well developed, well nourished 73 y.o. male in no acute

## 2024-02-13 RX ORDER — APIXABAN 5 MG/1
5 TABLET, FILM COATED ORAL 2 TIMES DAILY
Qty: 60 TABLET | Refills: 5 | OUTPATIENT
Start: 2024-02-13

## (undated) DEVICE — DRAPE SHEET ULTRAGARD: Brand: MEDLINE

## (undated) DEVICE — SUTURE VCRL + SZ 1 L27IN ABSRB UD OS 6 L36MM 1 2 CIR REV CUT VCP535H

## (undated) DEVICE — TAPE,CLOTH/SILK,CURAD,3"X10YD,LF,40/CS: Brand: CURAD

## (undated) DEVICE — INTENDED FOR TISSUE SEPARATION, AND OTHER PROCEDURES THAT REQUIRE A SHARP SURGICAL BLADE TO PUNCTURE OR CUT.: Brand: BARD-PARKER ® CARBON RIB-BACK BLADES

## (undated) DEVICE — SOLUTION SURG PREP 26 CC PURPREP

## (undated) DEVICE — GLOVE SURG SZ 8 L12IN FNGR THK79MIL GRN LTX FREE

## (undated) DEVICE — DRESSING,GAUZE,XEROFORM,CURAD,1"X8",ST: Brand: CURAD

## (undated) DEVICE — GUIDE WIRE, BALL-TIPPED, STERILE

## (undated) DEVICE — REAMER SHAFT, MOD.TRINKLE: Brand: BIXCUT

## (undated) DEVICE — PREP SKN CHLRAPRP APL 26ML STR --

## (undated) DEVICE — K-WIRE

## (undated) DEVICE — DRAPE,ISOLATION,INCISE,INVISISHIELD: Brand: MEDLINE

## (undated) DEVICE — SOLUTION IRRIG 1000ML 0.9% SOD CHL USP POUR PLAS BTL

## (undated) DEVICE — GARMENT,MEDLINE,DVT,INT,CALF,MED, GEN2: Brand: MEDLINE

## (undated) DEVICE — MAJOR ORTHO PROCEDURE PACK: Brand: MEDLINE INDUSTRIES, INC.

## (undated) DEVICE — PAD ABSORBENT 40X28 IN POLY BACKING BLU DRI-SAFE

## (undated) DEVICE — 3M™ TEGADERM™ TRANSPARENT FILM DRESSING FRAME STYLE, 1629, 8 IN X 12 IN (20 CM X 30 CM), 10/CT 8CT/CASE: Brand: 3M™ TEGADERM™

## (undated) DEVICE — LAMINECTOMY ARM CRADLE FOAM POSITIONER: Brand: CARDINAL HEALTH

## (undated) DEVICE — SUTURE VCRL SZ 0 L27IN ABSRB UD L36MM CP-1 1/2 CIR REV CUT J267H

## (undated) DEVICE — GAUZE,SPONGE,4"X4",16PLY,STRL,LF,10/TRAY: Brand: MEDLINE

## (undated) DEVICE — SUTURE ETHLN SZ 2-0 L18IN NONABSORBABLE BLK L26MM FS 3/8 664G

## (undated) DEVICE — GLOVE SURG SZ 8 CRM LTX FREE POLYISOPRENE POLYMER BEAD ANTI

## (undated) DEVICE — PAD,NON-ADHERENT,3X8,STERILE,LF,1/PK: Brand: MEDLINE

## (undated) DEVICE — COVER,TABLE,HEAVY DUTY,79"X110",STRL: Brand: MEDLINE

## (undated) DEVICE — SUTURE VCRL + SZ 2-0 L27IN ABSRB UD CP-1 1/2 CIR REV CUT VCP266H

## (undated) DEVICE — SOLUTION IRRIG 500ML 0.9% SOD CHLO USP POUR PLAS BTL

## (undated) DEVICE — SOUTHSIDE TURNOVER: Brand: MEDLINE INDUSTRIES, INC.

## (undated) DEVICE — 3M™ COBAN™ NL STERILE NON-LATEX SELF-ADHERENT WRAP, 2084S, 4 IN X 5 YD (10 CM X 4,5 M), 18 ROLLS/CASE: Brand: 3M™ COBAN™

## (undated) DEVICE — BASIC SINGLE BASIN-LF: Brand: MEDLINE INDUSTRIES, INC.

## (undated) DEVICE — DRILL, AO, STERILE